# Patient Record
Sex: FEMALE | Race: BLACK OR AFRICAN AMERICAN | Employment: OTHER | URBAN - METROPOLITAN AREA
[De-identification: names, ages, dates, MRNs, and addresses within clinical notes are randomized per-mention and may not be internally consistent; named-entity substitution may affect disease eponyms.]

---

## 2020-02-06 ENCOUNTER — HOSPITAL ENCOUNTER (OUTPATIENT)
Dept: PREADMISSION TESTING | Age: 70
Discharge: HOME | End: 2020-02-10
Payer: MEDICARE

## 2020-02-06 ENCOUNTER — HOSPITAL ENCOUNTER (OUTPATIENT)
Dept: GENERAL RADIOLOGY | Age: 70
Discharge: HOME | End: 2020-02-08
Payer: MEDICARE

## 2020-02-06 VITALS
SYSTOLIC BLOOD PRESSURE: 126 MMHG | DIASTOLIC BLOOD PRESSURE: 70 MMHG | HEART RATE: 87 BPM | OXYGEN SATURATION: 97 % | WEIGHT: 229 LBS | RESPIRATION RATE: 16 BRPM | BODY MASS INDEX: 38.15 KG/M2 | HEIGHT: 65 IN

## 2020-02-06 LAB
-: ABNORMAL
ABO/RH: NORMAL
ABSOLUTE EOS #: 0.13 K/UL (ref 0–0.44)
ABSOLUTE IMMATURE GRANULOCYTE: 0.01 K/UL (ref 0–0.3)
ABSOLUTE LYMPH #: 2.13 K/UL (ref 1.1–3.7)
ABSOLUTE MONO #: 0.67 K/UL (ref 0.1–1.2)
ALBUMIN SERPL-MCNC: 3.8 G/DL (ref 3.5–5.2)
ALBUMIN/GLOBULIN RATIO: ABNORMAL (ref 1–2.5)
ALP BLD-CCNC: 70 U/L (ref 35–104)
ALT SERPL-CCNC: 10 U/L (ref 5–33)
AMORPHOUS: ABNORMAL
ANION GAP SERPL CALCULATED.3IONS-SCNC: 13 MMOL/L (ref 9–17)
ANTIBODY SCREEN: NEGATIVE
ARM BAND NUMBER: NORMAL
AST SERPL-CCNC: 15 U/L
BACTERIA: ABNORMAL
BASOPHILS # BLD: 0 % (ref 0–2)
BASOPHILS ABSOLUTE: 0.03 K/UL (ref 0–0.2)
BILIRUB SERPL-MCNC: 0.15 MG/DL (ref 0.3–1.2)
BILIRUBIN URINE: NEGATIVE
BUN BLDV-MCNC: 24 MG/DL (ref 8–23)
BUN/CREAT BLD: 25 (ref 9–20)
CALCIUM SERPL-MCNC: 9.4 MG/DL (ref 8.6–10.4)
CASTS UA: ABNORMAL /LPF
CHLORIDE BLD-SCNC: 101 MMOL/L (ref 98–107)
CO2: 21 MMOL/L (ref 20–31)
COLOR: YELLOW
COMMENT UA: ABNORMAL
CREAT SERPL-MCNC: 0.96 MG/DL (ref 0.5–0.9)
CRYSTALS, UA: ABNORMAL /HPF
DIFFERENTIAL TYPE: ABNORMAL
EOSINOPHILS RELATIVE PERCENT: 2 % (ref 1–4)
EPITHELIAL CELLS UA: ABNORMAL /HPF (ref 0–5)
ESTIMATED AVERAGE GLUCOSE: 140 MG/DL
EXPIRATION DATE: NORMAL
GFR AFRICAN AMERICAN: >60 ML/MIN
GFR NON-AFRICAN AMERICAN: 58 ML/MIN
GFR SERPL CREATININE-BSD FRML MDRD: ABNORMAL ML/MIN/{1.73_M2}
GFR SERPL CREATININE-BSD FRML MDRD: ABNORMAL ML/MIN/{1.73_M2}
GLUCOSE BLD-MCNC: 123 MG/DL (ref 70–99)
GLUCOSE URINE: NEGATIVE
HBA1C MFR BLD: 6.5 % (ref 4–6)
HCT VFR BLD CALC: 47.7 % (ref 36.3–47.1)
HEMOGLOBIN: 14.6 G/DL (ref 11.9–15.1)
IMMATURE GRANULOCYTES: 0 %
KETONES, URINE: NEGATIVE
LEUKOCYTE ESTERASE, URINE: ABNORMAL
LYMPHOCYTES # BLD: 30 % (ref 24–43)
MCH RBC QN AUTO: 26.7 PG (ref 25.2–33.5)
MCHC RBC AUTO-ENTMCNC: 30.6 G/DL (ref 28.4–34.8)
MCV RBC AUTO: 87.2 FL (ref 82.6–102.9)
MONOCYTES # BLD: 9 % (ref 3–12)
MRSA, DNA, NASAL: NORMAL
MUCUS: ABNORMAL
NITRITE, URINE: POSITIVE
NRBC AUTOMATED: ABNORMAL PER 100 WBC
OTHER OBSERVATIONS UA: ABNORMAL
PDW BLD-RTO: 14.3 % (ref 11.8–14.4)
PH UA: 5.5 (ref 5–8)
PLATELET # BLD: 256 K/UL (ref 138–453)
PLATELET ESTIMATE: ABNORMAL
PMV BLD AUTO: 10.6 FL (ref 8.1–13.5)
POTASSIUM SERPL-SCNC: 5 MMOL/L (ref 3.7–5.3)
PROTEIN UA: NEGATIVE
RBC # BLD: 5.47 M/UL (ref 3.95–5.11)
RBC # BLD: ABNORMAL 10*6/UL
RBC UA: ABNORMAL /HPF (ref 0–2)
RENAL EPITHELIAL, UA: ABNORMAL /HPF
SEDIMENTATION RATE, ERYTHROCYTE: 8 MM (ref 0–20)
SEG NEUTROPHILS: 59 % (ref 36–65)
SEGMENTED NEUTROPHILS ABSOLUTE COUNT: 4.21 K/UL (ref 1.5–8.1)
SODIUM BLD-SCNC: 135 MMOL/L (ref 135–144)
SPECIFIC GRAVITY UA: 1.02 (ref 1–1.03)
SPECIMEN DESCRIPTION: NORMAL
TOTAL PROTEIN: 7.5 G/DL (ref 6.4–8.3)
TRICHOMONAS: ABNORMAL
TURBIDITY: ABNORMAL
URINE HGB: NEGATIVE
UROBILINOGEN, URINE: NORMAL
WBC # BLD: 7.2 K/UL (ref 3.5–11.3)
WBC # BLD: ABNORMAL 10*3/UL
WBC UA: ABNORMAL /HPF (ref 0–5)
YEAST: ABNORMAL

## 2020-02-06 PROCEDURE — 83036 HEMOGLOBIN GLYCOSYLATED A1C: CPT

## 2020-02-06 PROCEDURE — 86850 RBC ANTIBODY SCREEN: CPT

## 2020-02-06 PROCEDURE — 85651 RBC SED RATE NONAUTOMATED: CPT

## 2020-02-06 PROCEDURE — 86901 BLOOD TYPING SEROLOGIC RH(D): CPT

## 2020-02-06 PROCEDURE — 80053 COMPREHEN METABOLIC PANEL: CPT

## 2020-02-06 PROCEDURE — 81001 URINALYSIS AUTO W/SCOPE: CPT

## 2020-02-06 PROCEDURE — 87641 MR-STAPH DNA AMP PROBE: CPT

## 2020-02-06 PROCEDURE — 87077 CULTURE AEROBIC IDENTIFY: CPT

## 2020-02-06 PROCEDURE — 86900 BLOOD TYPING SEROLOGIC ABO: CPT

## 2020-02-06 PROCEDURE — 87186 SC STD MICRODIL/AGAR DIL: CPT

## 2020-02-06 PROCEDURE — 85025 COMPLETE CBC W/AUTO DIFF WBC: CPT

## 2020-02-06 PROCEDURE — 36415 COLL VENOUS BLD VENIPUNCTURE: CPT

## 2020-02-06 PROCEDURE — 87086 URINE CULTURE/COLONY COUNT: CPT

## 2020-02-06 PROCEDURE — 71046 X-RAY EXAM CHEST 2 VIEWS: CPT

## 2020-02-06 RX ORDER — POTASSIUM CHLORIDE 750 MG/1
10 CAPSULE, EXTENDED RELEASE ORAL DAILY
COMMUNITY

## 2020-02-06 RX ORDER — MONTELUKAST SODIUM 10 MG/1
10 TABLET ORAL DAILY
COMMUNITY

## 2020-02-06 RX ORDER — SERTRALINE HYDROCHLORIDE 25 MG/1
12.5 TABLET, FILM COATED ORAL DAILY
COMMUNITY

## 2020-02-06 RX ORDER — ENALAPRIL MALEATE 20 MG/1
20 TABLET ORAL DAILY
COMMUNITY

## 2020-02-06 RX ORDER — AMLODIPINE BESYLATE 10 MG/1
10 TABLET ORAL DAILY
COMMUNITY

## 2020-02-06 RX ORDER — FUROSEMIDE 20 MG/1
20 TABLET ORAL DAILY
COMMUNITY

## 2020-02-06 RX ORDER — METFORMIN HYDROCHLORIDE 500 MG/1
1000 TABLET, EXTENDED RELEASE ORAL
COMMUNITY

## 2020-02-06 RX ORDER — CLONIDINE HYDROCHLORIDE 0.2 MG/1
0.2 TABLET ORAL 2 TIMES DAILY
COMMUNITY

## 2020-02-06 RX ORDER — ASPIRIN 325 MG
325 TABLET ORAL DAILY
Status: ON HOLD | COMMUNITY
End: 2020-02-26 | Stop reason: HOSPADM

## 2020-02-06 RX ORDER — MULTIVIT WITH MINERALS/LUTEIN
2000 TABLET ORAL DAILY
COMMUNITY

## 2020-02-06 SDOH — HEALTH STABILITY: MENTAL HEALTH: HOW OFTEN DO YOU HAVE A DRINK CONTAINING ALCOHOL?: NEVER

## 2020-02-06 ASSESSMENT — PROMIS GLOBAL HEALTH SCALE
IN GENERAL, PLEASE RATE HOW WELL YOU CARRY OUT YOUR USUAL SOCIAL ACTIVITIES (INCLUDES ACTIVITIES AT HOME, AT WORK, AND IN YOUR COMMUNITY, AND RESPONSIBILITIES AS A PARENT, CHILD, SPOUSE, EMPLOYEE, FRIEND, ETC) [ON A SCALE OF 1 (POOR) TO 5 (EXCELLENT)]?: 3
IN GENERAL, HOW WOULD YOU RATE YOUR MENTAL HEALTH, INCLUDING YOUR MOOD AND YOUR ABILITY TO THINK [ON A SCALE OF 1 (POOR) TO 5 (EXCELLENT)]?: 4
HOW IS THE PROMIS V1.1 BEING ADMINISTERED?: 0
SUM OF RESPONSES TO QUESTIONS 2, 4, 5, & 10: 14
IN THE PAST 7 DAYS, HOW OFTEN HAVE YOU BEEN BOTHERED BY EMOTIONAL PROBLEMS, SUCH AS FEELING ANXIOUS, DEPRESSED, OR IRRITABLE [ON A SCALE FROM 1 (NEVER) TO 5 (ALWAYS)]?: 3
IN GENERAL, HOW WOULD YOU RATE YOUR SATISFACTION WITH YOUR SOCIAL ACTIVITIES AND RELATIONSHIPS [ON A SCALE OF 1 (POOR) TO 5 (EXCELLENT)]?: 3
SUM OF RESPONSES TO QUESTIONS 3, 6, 7, & 8: 18
IN GENERAL, HOW WOULD YOU RATE YOUR PHYSICAL HEALTH [ON A SCALE OF 1 (POOR) TO 5 (EXCELLENT)]?: 3
IN GENERAL, WOULD YOU SAY YOUR HEALTH IS...[ON A SCALE OF 1 (POOR) TO 5 (EXCELLENT)]: 3
TO WHAT EXTENT ARE YOU ABLE TO CARRY OUT YOUR EVERYDAY PHYSICAL ACTIVITIES SUCH AS WALKING, CLIMBING STAIRS, CARRYING GROCERIES, OR MOVING A CHAIR [ON A SCALE OF 1 (NOT AT ALL) TO 5 (COMPLETELY)]?: 3
WHO IS THE PERSON COMPLETING THE PROMIS V1.1 SURVEY?: 0
IN THE PAST 7 DAYS, HOW WOULD YOU RATE YOUR FATIGUE ON AVERAGE [ON A SCALE FROM 1 (NONE) TO 5 (VERY SEVERE)]?: 3
IN THE PAST 7 DAYS, HOW WOULD YOU RATE YOUR PAIN ON AVERAGE [ON A SCALE FROM 0 (NO PAIN) TO 10 (WORST IMAGINABLE PAIN)]?: 9
IN GENERAL, WOULD YOU SAY YOUR QUALITY OF LIFE IS...[ON A SCALE OF 1 (POOR) TO 5 (EXCELLENT)]: 4

## 2020-02-06 ASSESSMENT — PAIN DESCRIPTION - LOCATION: LOCATION: KNEE

## 2020-02-06 ASSESSMENT — KOOS JR
HOW SEVERE IS YOUR KNEE STIFFNESS AFTER FIRST WAKING IN MORNING: 3
TWISING OR PIVOTING ON KNEE: 3
GOING UP OR DOWN STAIRS: 4
RISING FROM SITTING: 2
STRAIGHTENING KNEE FULLY: 3
STANDING UPRIGHT: 2
BENDING TO THE FLOOR TO PICK UP OBJECT: 2

## 2020-02-06 ASSESSMENT — PAIN SCALES - GENERAL: PAINLEVEL_OUTOF10: 9

## 2020-02-06 ASSESSMENT — PAIN DESCRIPTION - PROGRESSION: CLINICAL_PROGRESSION: NOT CHANGED

## 2020-02-06 ASSESSMENT — PAIN - FUNCTIONAL ASSESSMENT: PAIN_FUNCTIONAL_ASSESSMENT: PREVENTS OR INTERFERES WITH ALL ACTIVE AND SOME PASSIVE ACTIVITIES

## 2020-02-06 ASSESSMENT — PAIN DESCRIPTION - ORIENTATION: ORIENTATION: LEFT;RIGHT

## 2020-02-06 ASSESSMENT — PAIN DESCRIPTION - FREQUENCY: FREQUENCY: CONTINUOUS

## 2020-02-06 ASSESSMENT — PAIN DESCRIPTION - PAIN TYPE: TYPE: CHRONIC PAIN

## 2020-02-06 NOTE — PROGRESS NOTES
[address]  Joint Replacement Pre-surgical Assessment    Scheduled Surgery Date: 2020  Surgery Time: 730    Surgeon: [place] Cheeks  Procedure: right Total Knee    Primary Insurance Coverage MEDICARE PART A/[address] [place] [address] FEDERAL  Pre-op class attended YES 2020    PCP: Fer Marie MD  Clearance received by PCP: Yes    Anticipated Discharge Plan: home  Agency (if applicable): UNSURE    Significant PMH:   CARDIAC CATHETERIZATION   x 1. 5-6 years ago (Written 2020).  No stents placed per pt. Provider   CATARACT REMOVAL WITH IMPLANT Bilateral   Provider    SECTION   x2 Provider   COLONOSCOPY    Provider   FRACTURE SURGERY Right  ankle Provider   GASTRIC BYPASS SURGERY    Provider   HERNIA REPAIR   abdominal x2 Provider   HYSTERECTOMY    Provider   OTHER SURGICAL HISTORY   benign skin lesions removed from face Provider   ROTATOR CUFF REPAIR Left   Provider   SMALL INTESTINE SURGERY   gangrenous bowel Provider   TONSILLECTOMY AND ADENOIDECTOMY    Provider   ED Notes     ED Notes    Medical History     Diagnosis Date Comment Source   Anxiety   Patient   Arthritis   Provider   Asthma   Provider   CHF (congestive heart failure) ([facility] [facility]  x2 episodes, last episode  Provider   CKD (chronic kidney disease)  Per medical record Provider   Cold intolerance  Pt states she develops edema in her face, ears, hands, and feet with exposure to cold temperatures. Patient   COPD (chronic obstructive pulmonary disease) ([facility] [address]   Provider   Diabetes mellitus ([facility] [address]   Provider   Heat intolerance  Pt states she develops edema in her face, ears, hands, and feet with exposure to heat.  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

## 2020-02-06 NOTE — PRE-PROCEDURE INSTRUCTIONS
ARRIVE AT [facility] [address] ON Tuesday, 2/25/2020 at 0530 AM    Once you enter the hospital lobby, take the elevators to the second floor. Check-In is at the surgery registration desk. Continue to take your home medications as you normally do up to and including the night before surgery with the exception of any blood thinning medications. Please stop any blood thinning medications as directed by your surgeon or prescribing physician. Failure to stop certain medications may interfere with your scheduled surgery. These may include:  Aspirin, Warfarin (Coumadin), Clopidogrel (Plavix), Ibuprofen (Motrin, Advil), Naproxen (Aleve), Meloxicam (Mobic), Celecoxib (Celebrex), Eliquis, Pradaxa, Xarelto, Effient, Fish Oil, Herbal supplements. Stop aspirin 10 days before surgery      If you are diabetic, do not take any of your diabetic medications by mouth the morning of surgery. If you are taking insulin contact the doctor that manages your diabetes for instructions about any changes to your insulin dosages the day before surgery. Do not inject insulin or other injectable diabetic medications the morning of surgery unless otherwise instructed by the doctor who manages your diabetes. Please take the following medication(s) the day of surgery with a small sip of water:  Clonidine,amlodipine,singular    Please use your inhaler(s) if needed and bring your inhaler(s) from home the day of surgery. PREPARING FOR YOUR SURGERY:     Before surgery, you can play an important role in your own health. Because skin is not sterile, we need to be sure that your skin is as free of germs as possible before surgery by carefully washing before surgery. Preparing or prepping skin before surgery can reduce the risk of a surgical site infection.   Do not shave the area of your body where your surgery will be performed unless you received specific permission from your physician.     You will need to shower at home the have someone to stay with you, your procedure may be cancelled.       If you have any other questions regarding your procedure or the day of surgery, please call [phone]      _________________________  ____________________________  Signature (Patient)              Signature (Provider) & date

## 2020-02-06 NOTE — H&P
History and Physical Service   [facility]    HISTORY AND PHYSICAL EXAMINATION            Date of Evaluation: 2/6/2020  Patient name:  Kim Ambriz  MRN:   [id number]  YOB: 1950  PCP:    Ashlie Jorgensen MD    History Obtained From:     Patient, Medical record    History of Present Illness: This is Kim Ambriz a 71 y.o. female who presents for a pre-admission testing appointment for an upcoming right total knee arthroplasty by Dr. Ira Hardin scheduled on 02/25/2020 at 0730 due to right knee osteoarthritis and osteoporosis. The patient's chief complaint is constant, 6-10/10 right knee pain that has progressively worsened over the past 5 years. Right knee pain is aggravated by walking; and is minimally relieved with rest. The right knee gives out, swells, and has decreased range of motion. Prior treatment includes right knee injections. Denies recent falls and injuries. Sleep apnea questionnaire  1) Do you snore loudly? No  2) Do you often feel tired, fatigued, or sleepy? Yes  3) Has anyone observed you stop breathing or choking/gasping during your sleep? No  4) Do you have hypertension? Yes  5) BMI >35 kg/m2? Yes  6) Age > 48? Yes  7) Pt is a male? No    History of asthma, COPD, CHF, hypertension, diabetes, CKD, and sickle cell anemia. S/p cardiac catheterization without stent placement 5-6 years ago per pt. Alert and oriented without apparent distress. Denies chest pain, dyspnea, dizziness, and palpitations. Pt follows-up with Dr. Rema Robertson from cardiology. Pt no longer follows-up with a hematologist.      Pt states her heart stopped for 1 minute while she was hospitalized 5-6 years ago. Denies problems with her heart since that time. Denies history of cardiac arrest, syncopal episodes, palpitations, and chest pain. Stress test completed on 01/10/2020 (See paper chart). ECHO completed 01/10/2020 EF 55-60% (See paper chart).      History of an allergic

## 2020-02-07 LAB
CULTURE: ABNORMAL
Lab: ABNORMAL
SPECIMEN DESCRIPTION: ABNORMAL

## 2020-02-10 RX ORDER — CLINDAMYCIN PHOSPHATE 900 MG/50ML
900 INJECTION INTRAVENOUS ONCE
Status: CANCELLED | OUTPATIENT
Start: 2020-02-25

## 2020-02-25 ENCOUNTER — ANESTHESIA (OUTPATIENT)
Dept: OPERATING ROOM | Age: 70
End: 2020-02-25
Payer: MEDICARE

## 2020-02-25 ENCOUNTER — HOSPITAL ENCOUNTER (OUTPATIENT)
Age: 70
Discharge: HOME | End: 2020-02-26
Attending: ORTHOPAEDIC SURGERY | Admitting: ORTHOPAEDIC SURGERY
Payer: MEDICARE

## 2020-02-25 ENCOUNTER — APPOINTMENT (OUTPATIENT)
Dept: GENERAL RADIOLOGY | Age: 70
End: 2020-02-25
Attending: ORTHOPAEDIC SURGERY
Payer: MEDICARE

## 2020-02-25 ENCOUNTER — ANESTHESIA EVENT (OUTPATIENT)
Dept: OPERATING ROOM | Age: 70
End: 2020-02-25
Payer: MEDICARE

## 2020-02-25 VITALS — DIASTOLIC BLOOD PRESSURE: 55 MMHG | TEMPERATURE: 96.4 F | OXYGEN SATURATION: 89 % | SYSTOLIC BLOOD PRESSURE: 93 MMHG

## 2020-02-25 PROBLEM — E11.9 TYPE 2 DIABETES MELLITUS WITHOUT COMPLICATION, WITHOUT LONG-TERM CURRENT USE OF INSULIN (HCC): Status: ACTIVE | Noted: 2020-02-25

## 2020-02-25 PROBLEM — I10 ESSENTIAL HYPERTENSION: Status: ACTIVE | Noted: 2020-02-25

## 2020-02-25 PROBLEM — M17.11 PRIMARY OSTEOARTHRITIS OF RIGHT KNEE: Chronic | Status: ACTIVE | Noted: 2020-02-25

## 2020-02-25 LAB
GLUCOSE BLD-MCNC: 134 MG/DL (ref 65–105)
GLUCOSE BLD-MCNC: 165 MG/DL (ref 65–105)
GLUCOSE BLD-MCNC: 190 MG/DL (ref 65–105)

## 2020-02-25 PROCEDURE — 2709999900 HC NON-CHARGEABLE SUPPLY: Performed by: ORTHOPAEDIC SURGERY

## 2020-02-25 PROCEDURE — 6360000002 HC RX W HCPCS: Performed by: NURSE ANESTHETIST, CERTIFIED REGISTERED

## 2020-02-25 PROCEDURE — 2580000003 HC RX 258: Performed by: ORTHOPAEDIC SURGERY

## 2020-02-25 PROCEDURE — 7100000000 HC PACU RECOVERY - FIRST 15 MIN: Performed by: ORTHOPAEDIC SURGERY

## 2020-02-25 PROCEDURE — 2500000003 HC RX 250 WO HCPCS: Performed by: ANESTHESIOLOGY

## 2020-02-25 PROCEDURE — 2580000003 HC RX 258: Performed by: NURSE ANESTHETIST, CERTIFIED REGISTERED

## 2020-02-25 PROCEDURE — 6360000002 HC RX W HCPCS: Performed by: ANESTHESIOLOGY

## 2020-02-25 PROCEDURE — 97535 SELF CARE MNGMENT TRAINING: CPT

## 2020-02-25 PROCEDURE — 6370000000 HC RX 637 (ALT 250 FOR IP): Performed by: NURSE PRACTITIONER

## 2020-02-25 PROCEDURE — 3700000001 HC ADD 15 MINUTES (ANESTHESIA): Performed by: ORTHOPAEDIC SURGERY

## 2020-02-25 PROCEDURE — 6370000000 HC RX 637 (ALT 250 FOR IP): Performed by: ORTHOPAEDIC SURGERY

## 2020-02-25 PROCEDURE — 7100000001 HC PACU RECOVERY - ADDTL 15 MIN: Performed by: ORTHOPAEDIC SURGERY

## 2020-02-25 PROCEDURE — 97161 PT EVAL LOW COMPLEX 20 MIN: CPT

## 2020-02-25 PROCEDURE — 97110 THERAPEUTIC EXERCISES: CPT

## 2020-02-25 PROCEDURE — 3700000000 HC ANESTHESIA ATTENDED CARE: Performed by: ORTHOPAEDIC SURGERY

## 2020-02-25 PROCEDURE — 6370000000 HC RX 637 (ALT 250 FOR IP): Performed by: ANESTHESIOLOGY

## 2020-02-25 PROCEDURE — 99213 OFFICE O/P EST LOW 20 MIN: CPT | Performed by: INTERNAL MEDICINE

## 2020-02-25 PROCEDURE — C1776 JOINT DEVICE (IMPLANTABLE): HCPCS | Performed by: ORTHOPAEDIC SURGERY

## 2020-02-25 PROCEDURE — 97530 THERAPEUTIC ACTIVITIES: CPT

## 2020-02-25 PROCEDURE — 3600000005 HC SURGERY LEVEL 5 BASE: Performed by: ORTHOPAEDIC SURGERY

## 2020-02-25 PROCEDURE — 2580000003 HC RX 258: Performed by: ANESTHESIOLOGY

## 2020-02-25 PROCEDURE — 73560 X-RAY EXAM OF KNEE 1 OR 2: CPT

## 2020-02-25 PROCEDURE — 3600000015 HC SURGERY LEVEL 5 ADDTL 15MIN: Performed by: ORTHOPAEDIC SURGERY

## 2020-02-25 PROCEDURE — 97116 GAIT TRAINING THERAPY: CPT

## 2020-02-25 PROCEDURE — 2500000003 HC RX 250 WO HCPCS: Performed by: ORTHOPAEDIC SURGERY

## 2020-02-25 PROCEDURE — 64447 NJX AA&/STRD FEMORAL NRV IMG: CPT | Performed by: ANESTHESIOLOGY

## 2020-02-25 PROCEDURE — 82947 ASSAY GLUCOSE BLOOD QUANT: CPT

## 2020-02-25 PROCEDURE — 2500000003 HC RX 250 WO HCPCS: Performed by: NURSE ANESTHETIST, CERTIFIED REGISTERED

## 2020-02-25 PROCEDURE — 97166 OT EVAL MOD COMPLEX 45 MIN: CPT

## 2020-02-25 PROCEDURE — C1713 ANCHOR/SCREW BN/BN,TIS/BN: HCPCS | Performed by: ORTHOPAEDIC SURGERY

## 2020-02-25 DEVICE — COMPONENT PAT DIA32MM KNEE POLY CEM MEDIALIZED ANAT ATTUNE: Type: IMPLANTABLE DEVICE | Site: KNEE | Status: FUNCTIONAL

## 2020-02-25 DEVICE — STEM FEM L50MM DIA14MM KNEE CEM REV ATTUNE: Type: IMPLANTABLE DEVICE | Site: KNEE | Status: FUNCTIONAL

## 2020-02-25 DEVICE — BASEPLATE TIB SZ 5 FIX BEAR CO CHROM MOLYBDENUM TI ALLY END: Type: IMPLANTABLE DEVICE | Site: KNEE | Status: FUNCTIONAL

## 2020-02-25 DEVICE — IMPLANTABLE DEVICE: Type: IMPLANTABLE DEVICE | Site: KNEE | Status: FUNCTIONAL

## 2020-02-25 DEVICE — CEMENT BNE 40GM FULL DOSE PMMA W/O ANTIBIO HI VISC N RADPQ: Type: IMPLANTABLE DEVICE | Site: KNEE | Status: FUNCTIONAL

## 2020-02-25 RX ORDER — TRANEXAMIC ACID 100 MG/ML
INJECTION, SOLUTION INTRAVENOUS PRN
Status: DISCONTINUED | OUTPATIENT
Start: 2020-02-25 | End: 2020-02-25 | Stop reason: SDUPTHER

## 2020-02-25 RX ORDER — PHENYLEPHRINE HCL IN 0.9% NACL 1 MG/10 ML
SYRINGE (ML) INTRAVENOUS PRN
Status: DISCONTINUED | OUTPATIENT
Start: 2020-02-25 | End: 2020-02-25 | Stop reason: SDUPTHER

## 2020-02-25 RX ORDER — SODIUM CHLORIDE, SODIUM LACTATE, POTASSIUM CHLORIDE, CALCIUM CHLORIDE 600; 310; 30; 20 MG/100ML; MG/100ML; MG/100ML; MG/100ML
INJECTION, SOLUTION INTRAVENOUS CONTINUOUS
Status: DISCONTINUED | OUTPATIENT
Start: 2020-02-25 | End: 2020-02-26 | Stop reason: HOSPADM

## 2020-02-25 RX ORDER — CLINDAMYCIN PHOSPHATE 900 MG/50ML
900 INJECTION INTRAVENOUS ONCE
Status: COMPLETED | OUTPATIENT
Start: 2020-02-25 | End: 2020-02-25

## 2020-02-25 RX ORDER — SENNA AND DOCUSATE SODIUM 50; 8.6 MG/1; MG/1
1 TABLET, FILM COATED ORAL 2 TIMES DAILY
Status: DISCONTINUED | OUTPATIENT
Start: 2020-02-25 | End: 2020-02-25

## 2020-02-25 RX ORDER — ENALAPRIL MALEATE 10 MG/1
20 TABLET ORAL DAILY
Status: DISCONTINUED | OUTPATIENT
Start: 2020-02-26 | End: 2020-02-25

## 2020-02-25 RX ORDER — GABAPENTIN 300 MG/1
300 CAPSULE ORAL ONCE
Status: COMPLETED | OUTPATIENT
Start: 2020-02-25 | End: 2020-02-25

## 2020-02-25 RX ORDER — ASCORBIC ACID 500 MG
2000 TABLET ORAL DAILY
Status: DISCONTINUED | OUTPATIENT
Start: 2020-02-25 | End: 2020-02-25

## 2020-02-25 RX ORDER — PROMETHAZINE HYDROCHLORIDE 25 MG/1
12.5 TABLET ORAL EVERY 6 HOURS PRN
Status: DISCONTINUED | OUTPATIENT
Start: 2020-02-25 | End: 2020-02-26 | Stop reason: HOSPADM

## 2020-02-25 RX ORDER — OXYCODONE HYDROCHLORIDE 5 MG/1
5 TABLET ORAL EVERY 4 HOURS PRN
Status: DISCONTINUED | OUTPATIENT
Start: 2020-02-25 | End: 2020-02-25

## 2020-02-25 RX ORDER — ROPIVACAINE HYDROCHLORIDE 5 MG/ML
INJECTION, SOLUTION EPIDURAL; INFILTRATION; PERINEURAL
Status: COMPLETED | OUTPATIENT
Start: 2020-02-25 | End: 2020-02-25

## 2020-02-25 RX ORDER — ONDANSETRON 2 MG/ML
4 INJECTION INTRAMUSCULAR; INTRAVENOUS
Status: DISCONTINUED | OUTPATIENT
Start: 2020-02-25 | End: 2020-02-25 | Stop reason: HOSPADM

## 2020-02-25 RX ORDER — ENALAPRIL MALEATE 20 MG/1
20 TABLET ORAL DAILY
Status: DISCONTINUED | OUTPATIENT
Start: 2020-02-26 | End: 2020-02-26 | Stop reason: HOSPADM

## 2020-02-25 RX ORDER — OXYCODONE HYDROCHLORIDE 5 MG/1
10 TABLET ORAL EVERY 4 HOURS PRN
Status: DISCONTINUED | OUTPATIENT
Start: 2020-02-25 | End: 2020-02-25

## 2020-02-25 RX ORDER — ACETAMINOPHEN 500 MG
1000 TABLET ORAL ONCE
Status: COMPLETED | OUTPATIENT
Start: 2020-02-25 | End: 2020-02-25

## 2020-02-25 RX ORDER — FAMOTIDINE 20 MG/1
20 TABLET, FILM COATED ORAL 2 TIMES DAILY PRN
Status: DISCONTINUED | OUTPATIENT
Start: 2020-02-25 | End: 2020-02-26 | Stop reason: HOSPADM

## 2020-02-25 RX ORDER — MONTELUKAST SODIUM 10 MG/1
10 TABLET ORAL DAILY
Status: DISCONTINUED | OUTPATIENT
Start: 2020-02-26 | End: 2020-02-26 | Stop reason: HOSPADM

## 2020-02-25 RX ORDER — SODIUM CHLORIDE 0.9 % (FLUSH) 0.9 %
10 SYRINGE (ML) INJECTION EVERY 12 HOURS SCHEDULED
Status: DISCONTINUED | OUTPATIENT
Start: 2020-02-25 | End: 2020-02-26 | Stop reason: HOSPADM

## 2020-02-25 RX ORDER — GLYCOPYRROLATE 1 MG/5 ML
SYRINGE (ML) INTRAVENOUS PRN
Status: DISCONTINUED | OUTPATIENT
Start: 2020-02-25 | End: 2020-02-25 | Stop reason: SDUPTHER

## 2020-02-25 RX ORDER — CLINDAMYCIN PHOSPHATE 900 MG/50ML
900 INJECTION INTRAVENOUS EVERY 8 HOURS
Status: COMPLETED | OUTPATIENT
Start: 2020-02-25 | End: 2020-02-25

## 2020-02-25 RX ORDER — CLONIDINE HYDROCHLORIDE 0.2 MG/1
0.2 TABLET ORAL 2 TIMES DAILY
Status: DISCONTINUED | OUTPATIENT
Start: 2020-02-25 | End: 2020-02-26 | Stop reason: HOSPADM

## 2020-02-25 RX ORDER — SODIUM CHLORIDE, SODIUM LACTATE, POTASSIUM CHLORIDE, CALCIUM CHLORIDE 600; 310; 30; 20 MG/100ML; MG/100ML; MG/100ML; MG/100ML
INJECTION, SOLUTION INTRAVENOUS CONTINUOUS PRN
Status: DISCONTINUED | OUTPATIENT
Start: 2020-02-25 | End: 2020-02-25 | Stop reason: SDUPTHER

## 2020-02-25 RX ORDER — POLYETHYLENE GLYCOL 3350 17 G/17G
17 POWDER, FOR SOLUTION ORAL DAILY
Status: DISCONTINUED | OUTPATIENT
Start: 2020-02-25 | End: 2020-02-26 | Stop reason: HOSPADM

## 2020-02-25 RX ORDER — PROPOFOL 10 MG/ML
INJECTION, EMULSION INTRAVENOUS CONTINUOUS PRN
Status: DISCONTINUED | OUTPATIENT
Start: 2020-02-25 | End: 2020-02-25 | Stop reason: SDUPTHER

## 2020-02-25 RX ORDER — METFORMIN HYDROCHLORIDE 500 MG/1
500 TABLET, EXTENDED RELEASE ORAL
Status: DISCONTINUED | OUTPATIENT
Start: 2020-02-26 | End: 2020-02-26 | Stop reason: HOSPADM

## 2020-02-25 RX ORDER — DEXTROSE MONOHYDRATE 25 G/50ML
12.5 INJECTION, SOLUTION INTRAVENOUS PRN
Status: DISCONTINUED | OUTPATIENT
Start: 2020-02-25 | End: 2020-02-26 | Stop reason: HOSPADM

## 2020-02-25 RX ORDER — ONDANSETRON 4 MG/1
4 TABLET, ORALLY DISINTEGRATING ORAL EVERY 6 HOURS PRN
Status: DISCONTINUED | OUTPATIENT
Start: 2020-02-25 | End: 2020-02-26 | Stop reason: HOSPADM

## 2020-02-25 RX ORDER — HYDROMORPHONE HCL 110MG/55ML
0.25 PATIENT CONTROLLED ANALGESIA SYRINGE INTRAVENOUS EVERY 5 MIN PRN
Status: DISCONTINUED | OUTPATIENT
Start: 2020-02-25 | End: 2020-02-25 | Stop reason: HOSPADM

## 2020-02-25 RX ORDER — DEXTROSE MONOHYDRATE 50 MG/ML
100 INJECTION, SOLUTION INTRAVENOUS PRN
Status: DISCONTINUED | OUTPATIENT
Start: 2020-02-25 | End: 2020-02-26 | Stop reason: HOSPADM

## 2020-02-25 RX ORDER — FENTANYL CITRATE 50 UG/ML
25 INJECTION, SOLUTION INTRAMUSCULAR; INTRAVENOUS EVERY 5 MIN PRN
Status: DISCONTINUED | OUTPATIENT
Start: 2020-02-25 | End: 2020-02-25 | Stop reason: HOSPADM

## 2020-02-25 RX ORDER — SODIUM CHLORIDE, SODIUM LACTATE, POTASSIUM CHLORIDE, CALCIUM CHLORIDE 600; 310; 30; 20 MG/100ML; MG/100ML; MG/100ML; MG/100ML
INJECTION, SOLUTION INTRAVENOUS CONTINUOUS
Status: DISCONTINUED | OUTPATIENT
Start: 2020-02-25 | End: 2020-02-25

## 2020-02-25 RX ORDER — BUPIVACAINE HYDROCHLORIDE 5 MG/ML
INJECTION, SOLUTION EPIDURAL; INTRACAUDAL
Status: COMPLETED | OUTPATIENT
Start: 2020-02-25 | End: 2020-02-25

## 2020-02-25 RX ORDER — SODIUM CHLORIDE 0.9 % (FLUSH) 0.9 %
10 SYRINGE (ML) INJECTION PRN
Status: DISCONTINUED | OUTPATIENT
Start: 2020-02-25 | End: 2020-02-26 | Stop reason: HOSPADM

## 2020-02-25 RX ORDER — ONDANSETRON 2 MG/ML
4 INJECTION INTRAMUSCULAR; INTRAVENOUS EVERY 6 HOURS PRN
Status: DISCONTINUED | OUTPATIENT
Start: 2020-02-25 | End: 2020-02-26 | Stop reason: HOSPADM

## 2020-02-25 RX ORDER — TRAMADOL HYDROCHLORIDE 50 MG/1
50 TABLET ORAL EVERY 4 HOURS PRN
Status: DISCONTINUED | OUTPATIENT
Start: 2020-02-25 | End: 2020-02-26 | Stop reason: HOSPADM

## 2020-02-25 RX ORDER — LIDOCAINE HYDROCHLORIDE 20 MG/ML
INJECTION, SOLUTION EPIDURAL; INFILTRATION; INTRACAUDAL; PERINEURAL PRN
Status: DISCONTINUED | OUTPATIENT
Start: 2020-02-25 | End: 2020-02-25 | Stop reason: SDUPTHER

## 2020-02-25 RX ORDER — ONDANSETRON 2 MG/ML
4 INJECTION INTRAMUSCULAR; INTRAVENOUS EVERY 6 HOURS PRN
Status: DISCONTINUED | OUTPATIENT
Start: 2020-02-25 | End: 2020-02-25 | Stop reason: SDUPTHER

## 2020-02-25 RX ORDER — FUROSEMIDE 20 MG/1
20 TABLET ORAL DAILY
Status: DISCONTINUED | OUTPATIENT
Start: 2020-02-26 | End: 2020-02-25

## 2020-02-25 RX ORDER — MIDAZOLAM HYDROCHLORIDE 1 MG/ML
2 INJECTION INTRAMUSCULAR; INTRAVENOUS ONCE
Status: COMPLETED | OUTPATIENT
Start: 2020-02-25 | End: 2020-02-25

## 2020-02-25 RX ORDER — ACETAMINOPHEN 325 MG/1
650 TABLET ORAL EVERY 6 HOURS SCHEDULED
Status: DISCONTINUED | OUTPATIENT
Start: 2020-02-25 | End: 2020-02-26 | Stop reason: HOSPADM

## 2020-02-25 RX ORDER — SERTRALINE HYDROCHLORIDE 25 MG/1
12.5 TABLET, FILM COATED ORAL DAILY
Status: DISCONTINUED | OUTPATIENT
Start: 2020-02-26 | End: 2020-02-26 | Stop reason: HOSPADM

## 2020-02-25 RX ORDER — LANOLIN ALCOHOL/MO/W.PET/CERES
3 CREAM (GRAM) TOPICAL NIGHTLY PRN
Status: DISCONTINUED | OUTPATIENT
Start: 2020-02-25 | End: 2020-02-26 | Stop reason: HOSPADM

## 2020-02-25 RX ORDER — AMLODIPINE BESYLATE 10 MG/1
10 TABLET ORAL DAILY
Status: DISCONTINUED | OUTPATIENT
Start: 2020-02-25 | End: 2020-02-26 | Stop reason: HOSPADM

## 2020-02-25 RX ORDER — POTASSIUM CHLORIDE 750 MG/1
10 TABLET, FILM COATED, EXTENDED RELEASE ORAL DAILY
Status: DISCONTINUED | OUTPATIENT
Start: 2020-02-26 | End: 2020-02-26 | Stop reason: HOSPADM

## 2020-02-25 RX ADMIN — SODIUM CHLORIDE, POTASSIUM CHLORIDE, SODIUM LACTATE AND CALCIUM CHLORIDE: 600; 310; 30; 20 INJECTION, SOLUTION INTRAVENOUS at 06:45

## 2020-02-25 RX ADMIN — Medication 50 MCG: at 09:22

## 2020-02-25 RX ADMIN — OXYCODONE HYDROCHLORIDE 10 MG: 5 TABLET ORAL at 17:10

## 2020-02-25 RX ADMIN — BUPIVACAINE HYDROCHLORIDE 10 ML: 5 INJECTION, SOLUTION EPIDURAL; INTRACAUDAL; PERINEURAL at 07:18

## 2020-02-25 RX ADMIN — CLINDAMYCIN PHOSPHATE 900 MG: 900 INJECTION, SOLUTION INTRAVENOUS at 07:54

## 2020-02-25 RX ADMIN — CLINDAMYCIN PHOSPHATE 900 MG: 900 INJECTION, SOLUTION INTRAVENOUS at 14:58

## 2020-02-25 RX ADMIN — MIDAZOLAM 2 MG: 1 INJECTION INTRAMUSCULAR; INTRAVENOUS at 07:02

## 2020-02-25 RX ADMIN — ROPIVACAINE HYDROCHLORIDE 30 ML: 5 INJECTION, SOLUTION EPIDURAL; INFILTRATION; PERINEURAL at 07:18

## 2020-02-25 RX ADMIN — ASPIRIN 325 MG: 325 TABLET, DELAYED RELEASE ORAL at 20:13

## 2020-02-25 RX ADMIN — PROPOFOL 50 MCG/KG/MIN: 10 INJECTION, EMULSION INTRAVENOUS at 07:57

## 2020-02-25 RX ADMIN — Medication 0.3 MG: at 08:28

## 2020-02-25 RX ADMIN — CLINDAMYCIN PHOSPHATE 900 MG: 900 INJECTION, SOLUTION INTRAVENOUS at 20:13

## 2020-02-25 RX ADMIN — GABAPENTIN 300 MG: 300 CAPSULE ORAL at 07:19

## 2020-02-25 RX ADMIN — Medication 50 MCG: at 08:33

## 2020-02-25 RX ADMIN — ACETAMINOPHEN 1000 MG: 500 TABLET ORAL at 07:19

## 2020-02-25 RX ADMIN — ACETAMINOPHEN 650 MG: 325 TABLET ORAL at 15:01

## 2020-02-25 RX ADMIN — TRAMADOL HYDROCHLORIDE 50 MG: 50 TABLET, FILM COATED ORAL at 20:13

## 2020-02-25 RX ADMIN — SODIUM CHLORIDE, POTASSIUM CHLORIDE, SODIUM LACTATE AND CALCIUM CHLORIDE: 600; 310; 30; 20 INJECTION, SOLUTION INTRAVENOUS at 18:10

## 2020-02-25 RX ADMIN — INSULIN LISPRO 1 UNITS: 100 INJECTION, SOLUTION INTRAVENOUS; SUBCUTANEOUS at 18:04

## 2020-02-25 RX ADMIN — ACETAMINOPHEN 650 MG: 325 TABLET ORAL at 20:13

## 2020-02-25 RX ADMIN — INSULIN LISPRO 1 UNITS: 100 INJECTION, SOLUTION INTRAVENOUS; SUBCUTANEOUS at 22:10

## 2020-02-25 RX ADMIN — LIDOCAINE HYDROCHLORIDE 60 MG: 20 INJECTION, SOLUTION EPIDURAL; INFILTRATION; INTRACAUDAL; PERINEURAL at 07:57

## 2020-02-25 RX ADMIN — Medication 25 MCG: at 07:55

## 2020-02-25 RX ADMIN — TRANEXAMIC ACID 1000 MG: 1 INJECTION, SOLUTION INTRAVENOUS at 08:11

## 2020-02-25 RX ADMIN — ASPIRIN 325 MG: 325 TABLET, DELAYED RELEASE ORAL at 15:02

## 2020-02-25 RX ADMIN — TRANEXAMIC ACID 1000 MG: 1 INJECTION, SOLUTION INTRAVENOUS at 09:10

## 2020-02-25 RX ADMIN — SODIUM CHLORIDE, POTASSIUM CHLORIDE, SODIUM LACTATE AND CALCIUM CHLORIDE: 600; 310; 30; 20 INJECTION, SOLUTION INTRAVENOUS at 07:39

## 2020-02-25 ASSESSMENT — PULMONARY FUNCTION TESTS
PIF_VALUE: 1
PIF_VALUE: 0
PIF_VALUE: 1
PIF_VALUE: 0
PIF_VALUE: 1
PIF_VALUE: 0
PIF_VALUE: 1
PIF_VALUE: 0
PIF_VALUE: 1
PIF_VALUE: 0
PIF_VALUE: 1
PIF_VALUE: 0
PIF_VALUE: 1
PIF_VALUE: 0
PIF_VALUE: 1

## 2020-02-25 ASSESSMENT — PAIN DESCRIPTION - DESCRIPTORS
DESCRIPTORS: ACHING;DULL
DESCRIPTORS: ACHING;CRAMPING

## 2020-02-25 ASSESSMENT — PAIN SCALES - GENERAL
PAINLEVEL_OUTOF10: 8
PAINLEVEL_OUTOF10: 0
PAINLEVEL_OUTOF10: 0
PAINLEVEL_OUTOF10: 5
PAINLEVEL_OUTOF10: 0
PAINLEVEL_OUTOF10: 4
PAINLEVEL_OUTOF10: 5
PAINLEVEL_OUTOF10: 0
PAINLEVEL_OUTOF10: 0

## 2020-02-25 ASSESSMENT — PAIN DESCRIPTION - DIRECTION: RADIATING_TOWARDS: RIGHT THIGH

## 2020-02-25 ASSESSMENT — PAIN DESCRIPTION - FREQUENCY: FREQUENCY: CONTINUOUS

## 2020-02-25 ASSESSMENT — PAIN - FUNCTIONAL ASSESSMENT
PAIN_FUNCTIONAL_ASSESSMENT: 0-10
PAIN_FUNCTIONAL_ASSESSMENT: PREVENTS OR INTERFERES SOME ACTIVE ACTIVITIES AND ADLS

## 2020-02-25 ASSESSMENT — PAIN DESCRIPTION - LOCATION: LOCATION: KNEE

## 2020-02-25 ASSESSMENT — PAIN DESCRIPTION - ONSET: ONSET: ON-GOING

## 2020-02-25 ASSESSMENT — PAIN DESCRIPTION - ORIENTATION: ORIENTATION: RIGHT

## 2020-02-25 ASSESSMENT — PAIN DESCRIPTION - PAIN TYPE: TYPE: SURGICAL PAIN

## 2020-02-25 ASSESSMENT — LIFESTYLE VARIABLES: SMOKING_STATUS: 0

## 2020-02-25 ASSESSMENT — PAIN DESCRIPTION - PROGRESSION: CLINICAL_PROGRESSION: NOT CHANGED

## 2020-02-25 NOTE — H&P
(See paper chart). History of an allergic reaction to extreme hot and cold temperatures. Pt states she develops edema in her face, ears, hands and feet with exposure to extreme hot or cold temperatures. Functional Capacity:              1) Pt is able to walk 2 city blocks on level ground without SOB. 2) Pt is able to climb 2 flights of stairs without SOB. Past Medical History:      Past Medical History        Past Medical History:   Diagnosis Date    Anxiety      Arthritis      Asthma      CHF (congestive heart failure) ([address]       x2 episodes, last episode     CKD (chronic kidney disease)       Per medical record    Cold intolerance       Pt states she develops edema in her face, ears, hands, and feet with exposure to cold temperatures.  COPD (chronic obstructive pulmonary disease) ([address]      Diabetes mellitus ([address]      Heat intolerance       Pt states she develops edema in her face, ears, hands, and feet with exposure to heat. 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 5-6 years ago (Written 2020). No stents placed per pt.      CATARACT REMOVAL WITH IMPLANT Bilateral       SECTION         x2    COLONOSCOPY        FRACTURE SURGERY Right       ankle    GASTRIC BYPASS SURGERY        HERNIA REPAIR         abdominal x2    HYSTERECTOMY        OTHER SURGICAL HISTORY         benign skin lesions removed from face    ROTATOR CUFF REPAIR Left      SMALL INTESTINE SURGERY         gangrenous bowel    TONSILLECTOMY AND ADENOIDECTOMY                Medications Prior to Admission:      Home Medications           Prior to Admission medications    Medication Sig Start Date End Date Taking? Authorizing Provider   potassium chloride (MICRO-K) 10 MEQ extended release capsule Take 10 mEq by mouth daily     Yes Historical Provider, MD   Ascorbic Acid (VITAMIN C) 1000 MG tablet Take 2,000 mg by mouth daily Takes (2) 1000mg tablets     Yes Historical Provider, MD   metFORMIN (GLUCOPHAGE-XR) 500 MG extended release tablet Take 500 mg by mouth daily (with breakfast)     Yes Historical Provider, MD   enalapril (VASOTEC) 20 MG tablet Take 20 mg by mouth daily     Yes Historical Provider, MD   montelukast (SINGULAIR) 10 MG tablet Take 10 mg by mouth daily     Yes Historical Provider, MD   sertraline (ZOLOFT) 25 MG tablet Take 12.5 mg by mouth daily Takes (1/2) of a 25 mg tablet     Yes Historical Provider, MD   aspirin 325 MG tablet Take 325 mg by mouth daily     Yes Historical Provider, MD   amLODIPine (NORVASC) 10 MG tablet Take 10 mg by mouth daily     Yes Historical Provider, MD   furosemide (LASIX) 20 MG tablet Take 20 mg by mouth daily     Yes Historical Provider, MD   cloNIDine (CATAPRES) 0.2 MG tablet Take 0.2 mg by mouth 2 times daily     Yes Historical Provider, MD            Allergies:      Red dye; Adhesive tape; Codeine; Keflex [cephalexin]; Kiwi extract; Other; Oxycodone; and Tetracyclines & related     Social History:      Tobacco:    reports that she quit smoking about 8 years ago. She has never used smokeless tobacco.  Alcohol:      reports previous alcohol use. Drug Use:  reports no history of drug use. Safety: Recommended a shower chair. Pt voiced understanding. Family History:      Family History         Family History   Problem Relation Age of Onset    Heart Disease Father      Heart Attack Paternal Grandmother              Review of Systems:      Positive and Negative as described in HPI. CONSTITUTIONAL: Negative for fevers, chills, sweats, fatigue, and weight loss. HEENT: Pt wears glasses. Sinus drainage.  Negative for hearing changes, rhinorrhea, and throat pain. RESPIRATORY: Dry cough. Negative for shortness of breath, congestion, and wheezing. CARDIOVASCULAR: Pt states her heart stopped for 1 minute 5-6 years ago. Negative for chest pain, blood clot, irregular heartbeat, and palpitations. GASTROINTESTINAL: Negative for reflux, nausea, vomiting, diarrhea, constipation, change in bowel habits, and abdominal pain. GENITOURINARY: Negative for difficulty of urination, burning with urination, and frequency. INTEGUMENT: Negative for rash, skin lesions, and easy bruising. Instructed pt to call Dr. Najma Cortés as soon as possible if a rash or wound develops prior to surgery. Pt voiced understanding. HEMATOLOGIC/LYMPHATIC: Right knee edema. Right ankle edema. ALLERGIC/IMMUNOLOGIC: Negative for urticaria and itching. ENDOCRINE: Diabetes. Last A1C was 6.2% per pt. Heat and cold intolerance. Pt states she is allergic to extreme hot or cold temperatures. Exposure to extreme temperatures causes edema in her face, ears, hands, and feet. Negative for increase in thirst and increase in urination. MUSCULOSKELETAL: See HPI. NEUROLOGICAL: Negative for headaches, dizziness, lightheadedness, numbness, and tingling extremities. BEHAVIOR/PSYCH: Treated anxiety. Negative for depression. Physical Exam:   /70   Pulse 87   Resp 16   Ht 5' 5\" (1.651 m)   Wt 229 lb (103.9 kg)   SpO2 97%   BMI 38.11 kg/m²   No LMP recorded. Patient has had a hysterectomy. No obstetric history on file. No results for input(s): POCGLU in the last 72 hours. General Appearance:  Alert, well appearing, and in no acute distress. Obese. Mental status: Oriented to person, place, and time. Head: Normocephalic and atraumatic. Eye: No icterus, redness, pupils equal and reactive, extraocular eye movements intact, and conjunctiva clear. Ear: Hearing grossly intact. Nose: No drainage noted.   Mouth: Mucous membranes moist.  Neck: Supple and no carotid bruits noted. Lungs: Very diminished throughout. Poor air exchange. No wheezing, rales or rhonchi. Normal effort. Cardiovascular: Distant heart sounds. Normal rate, regular rhythm, no murmur, gallop, or rub. Abdomen: Soft, non-tender, non-distended, and active bowel sounds. Neurologic: Normal speech and cranial nerves II through XII grossly intact. Strength 5/5 bilaterally. Skin: No gross lesions, rashes, bruising, or bleeding on exposed skin area. Extremities: Bilateral knee tenderness and edema. Moderate bilateral lower leg non-pitting edema. Antalgic gait. Pt is ambulating with a cane. Decreased range of motion in the right ankle. Posterior tibial pulses 2+ bilaterally. No calf tenderness with palpation. Psych: Normal affect.       Investigations:       Laboratory Testing:  Recent Results         Recent Results (from the past 24 hour(s))   CBC Auto Differential     Collection Time: 02/06/20  9:05 AM   Result Value Ref Range     WBC 7.2 3.5 - 11.3 k/uL     RBC 5.47 (H) 3.95 - 5.11 m/uL     Hemoglobin 14.6 11.9 - 15.1 g/dL     Hematocrit 47.7 (H) 36.3 - 47.1 %     MCV 87.2 82.6 - 102.9 fL     MCH 26.7 25.2 - 33.5 pg     MCHC 30.6 28.4 - 34.8 g/dL     RDW 14.3 11.8 - 14.4 %     Platelets 399 724 - 369 k/uL     MPV 10.6 8.1 - 13.5 fL     NRBC Automated NOT REPORTED 0.0 per 100 WBC     Differential Type NOT REPORTED       WBC Morphology NOT REPORTED       RBC Morphology NOT REPORTED       Platelet Estimate NOT REPORTED       Seg Neutrophils 59 36 - 65 %     Lymphocytes 30 24 - 43 %     Monocytes 9 3 - 12 %     Eosinophils % 2 1 - 4 %     Basophils 0 0 - 2 %     Immature Granulocytes 0 0 %     Segs Absolute 4.21 1.50 - 8.10 k/uL     Absolute Lymph # 2.13 1.10 - 3.70 k/uL     Absolute Mono # 0.67 0.10 - 1.20 k/uL     Absolute Eos # 0.13 0.00 - 0.44 k/uL     Basophils Absolute 0.03 0.00 - 0.20 k/uL     Absolute Immature Granulocyte 0.01 0.00 - 0.30 k/uL   Comprehensive Metabolic Panel     Collection Time: 20  9:05 AM   Result Value Ref Range     Glucose 123 (H) 70 - 99 mg/dL     BUN 24 (H) 8 - 23 mg/dL     CREATININE 0.96 (H) 0.50 - 0.90 mg/dL     Bun/Cre Ratio 25 (H) 9 - 20     Calcium 9.4 8.6 - 10.4 mg/dL     Sodium 135 135 - 144 mmol/L     Potassium 5.0 3.7 - 5.3 mmol/L     Chloride 101 98 - 107 mmol/L     CO2 21 20 - 31 mmol/L     Anion Gap 13 9 - 17 mmol/L     Alkaline Phosphatase 70 35 - 104 U/L     ALT 10 5 - 33 U/L     AST 15 <32 U/L     Total Bilirubin 0.15 (L) 0.3 - 1.2 mg/dL     Total Protein 7.5 6.4 - 8.3 g/dL     Alb 3.8 3.5 - 5.2 g/dL     Albumin/Globulin Ratio NOT REPORTED 1.0 - 2.5     GFR Non-African American 58 (L) >60 mL/min     GFR African American >60 >60 mL/min     GFR Comment            GFR Staging NOT REPORTED     TYPE AND SCREEN     Collection Time: 20  9:05 AM   Result Value Ref Range     Expiration Date 2020,2359       Arm Band Number [id number]       ABO/Rh A POSITIVE       Antibody Screen NEGATIVE                  Recent Labs     20  0905   HGB 14.6   HCT 47.7*   WBC 7.2   MCV 87.2      K 5.0      CO2 21   BUN 24*   CREATININE 0.96*   GLUCOSE 123*   AST 15   ALT 10   LABALBU 3.8         EK2020. See paper chart. Chest X-ray: Pending. Diagnosis:       1. Right knee osteoarthritis and osteoporosis     Plans:      1.  Right total knee arthroplasty        ROBY Solares CNP  2020  11:55 AM               Cosigned by: Karl Medrano MD at 2/10/2020 10:18 AM   Revision History     Date/Time User Provider Type Action   2/10/2020 10:18 AM Karl Medrano MD Physician Cosign   2020  3:12 PM ROBY Solares CNP Nurse Practitioner Sign   2020 11:56 AM ROBY Solares CNP Nurse Practitioner Incomplete Revision   2020 11:50 AM Flonnie Checo, APRN - CNP Nurse Practitioner Sign   View Details Report      Routing History

## 2020-02-25 NOTE — PROGRESS NOTES
Physical Therapy    Facility/Department: [facility]  Initial Assessment    NAME: Stefano Turner  : 1950  MRN: [id number]    Date of Service: 2020    Discharge Recommendations:  Home with assist PRN, Home with Home health PT, Continue to assess pending progress      Assessment   Body structures, Functions, Activity limitations: Decreased functional mobility ; Decreased strength;Decreased endurance;Decreased ROM; Decreased balance;Decreased safe awareness    Patient tolerated session well with moderate deficits noted in bed mobility, transfers, ambulation, balance, and endurance this session. Patient's R LE numbness and decreased strength are limiting factors with mobility at this time and patient demos significant deficits as compared to prior level of function. At current level of function, patient will benefit from continued inpatient PT services and will likely be safe to return home with assist as needed and home PT to promote improved safety and IND with all functional mobility after discharge. Decision Making: Low Complexity  PT Education: Goals;Precautions;PT Role;Plan of Care;Home Exercise Program;General Safety; Functional Mobility Training;Gait Training;Equipment  REQUIRES PT FOLLOW UP: Yes  Activity Tolerance  Activity Tolerance: Patient Tolerated treatment well;Patient limited by endurance       Patient Diagnosis(es): There were no encounter diagnoses. has a past medical history of Anxiety, Arthritis, Asthma, CHF (congestive heart failure) ([address] Cold intolerance, COPD (chronic obstructive pulmonary disease) ([address] Diabetes mellitus ([address] Disease of blood and blood forming organ, Heat intolerance, History of blood transfusion, History of fracture of right ankle, Hypertension, and Sickle cell anemia ([address] has a past surgical history that includes other surgical history; Hysterectomy; fracture surgery (Right);  Tonsillectomy and adenoidectomy; hernia repair; Cataract removal with implant (Bilateral); Small intestine surgery;  section; Gastric bypass surgery; Colonoscopy; Rotator cuff repair (Left); Cardiac catheterization; Abdomen surgery; and Total knee arthroplasty (Right, 2020).     Restrictions  Restrictions/Precautions  Restrictions/Precautions: Fall Risk, Weight Bearing  Required Braces or Orthoses?: Yes(R LE immobilizer)  Upper Extremity Weight Bearing Restrictions  Right Upper Extremity Weight Bearing: Weight Bearing As Tolerated  Position Activity Restriction  Other position/activity restrictions: RUE IV, CHERYL VAC, up to BS chair TID, B knee high TEds, may shower, amb in room and progress to ley with AD 4x/day, elevate op extemity, elevate heels off bed, BRP with assist, TKA precautions  Vision/Hearing  Vision: Within Functional Limits  Hearing: Within functional limits     Subjective  General  Patient assessed for rehabilitation services?: Yes  Family / Caregiver Present: No  Follows Commands: Within Functional Limits  Subjective  Subjective: Patient states that her right knee is numb  Pain Screening  Patient Currently in Pain: Denies  Vital Signs  Patient Currently in Pain: Denies       Orientation  Orientation  Overall Orientation Status: Within Functional Limits  Social/Functional History  Social/Functional History  Lives With: Family(3 grand sons and pt states they are home during the day and supportive(24, 24, 21 yrs old))  Type of Home: House  Home Layout: Able to Live on Main level with bedroom/bathroom, Laundry in basement(1.5 SH )  Home Access: Stairs to enter without rails(back door )  Entrance Stairs - Number of Steps: 2 plus 1   Bathroom Shower/Tub: Tub/Shower unit  Bathroom Toilet: Handicap height  Bathroom Equipment: (no DME )  Bathroom Accessibility: Walker accessible  Home Equipment: Rolling walker, [employer] Help From: Family(pt states she has a supportive family )  ADL Assistance: Independent  Homemaking Assistance: Needs assistance(pt states family does all laundry/meals and cleaning )  Homemaking Responsibilities: No  Ambulation Assistance: Independent(with cane )  Transfer Assistance: Independent  Active : Yes  Occupation: Retired  Type of occupation:    Leisure & Hobbies: paint   Additional Comments: Pt denies recent falls. Objective     Observation/Palpation  Observation: RUE IV, CHERYL VAC. Pt's states her RLE is still very numb and she feels like she is standing on a sponge -- immobilizer donned to increase safey/reduce buckling. Scar: TKA incision covered with dressing     AROM RLE (degrees)  RLE AROM: WFL  RLE General AROM: Grossly WFL throughout hip and ankle; knee ROM = -7 to 68 degrees  AROM LLE (degrees)  LLE AROM : WFL  LLE General AROM: Genu valgus noted in L LE  AROM RUE (degrees)  RUE AROM : WFL  AROM LUE (degrees)  LUE AROM : WFL  Strength RLE  Strength RLE: WFL  Comment: Grossly 4/5 throughout  Strength LLE  Strength LLE: WFL  Comment: Grossly 4/5 throughout  Strength RUE  Strength RUE: WFL  Comment: Grossly 4/5 throughout  Strength LUE  Strength LUE: WFL  Comment: Grossly 4/5 throughout  Tone RLE  RLE Tone: Normotonic  Tone LLE  LLE Tone: Normotonic  Motor Control  Gross Motor?: WFL  Sensation  Overall Sensation Status: WFL(Patient reports R LE numbness)  Bed mobility  Supine to Sit: Minimal assistance  Sit to Supine: Minimal assistance  Scooting: Minimal assistance  Comment: Assist mainly for R LE  Transfers  Sit to Stand: Moderate Assistance;2 Person Assistance  Stand to sit: Moderate Assistance;2 Person Assistance  Comment: Patient with numbness in R LE requiring use of immobilizer for standing today  Ambulation  Ambulation?: Yes  WB Status: WBAT R LE  Ambulation 1  Surface: level tile  Device: Rolling Walker  Other Apparatus: Knee Immobilizer  Assistance:  Moderate assistance;2 Person assistance  Quality of Gait: Gait limited by numbness in R LE -- side-stepped 7 small steps toward [facility] with mod A of 2 and RW  Distance: 5 feet  Stairs/Curb  Stairs?: No     Balance  Posture: Good  Sitting - Static: Good  Sitting - Dynamic: Fair;+  Standing - Static: Fair;+  Standing - Dynamic: Fair  Exercises  Comments: R LE exercises per TKA protocol with verbal cues for instruction     Plan   Plan  Times per week: Twice a day, 7 days a week  Current Treatment Recommendations: Strengthening, Transfer Training, Endurance Training, ROM, Balance Training, Gait Training, Functional Mobility Training, Stair training  Safety Devices  Type of devices: All fall risk precautions in place, Call light within reach, Gait belt, Left in chair, Nurse notified      AM-PAC Score  AM-PAC Inpatient Mobility Raw Score : 11 (02/25/20 1552)  AM-PAC Inpatient T-Scale Score : 33.86 (02/25/20 1552)  Mobility Inpatient CMS 0-100% Score: 72.57 (02/25/20 1552)  Mobility Inpatient CMS G-Code Modifier : CL (02/25/20 1552)          Goals  Short term goals  Time Frame for Short term goals: 10 visits  Short term goal 1: Patient will demo IND bed mobility to prevent complications of immobility  Short term goal 2: Patient will demo IND transfers to promote safe access to commode and bathroom  Short term goal 3: Patient will amb IND with appropriate device on level surfaces without LOB to promote safe negotiation of home environment  Short term goal 4: Patient will demo 'good' sitting and standing balance to promote increased safety with all standing activities  Short term goal 5: Patient will ascend/descend 3 stairs with rail to promote safe entry and exit from home  Patient Goals   Patient goals :  To get back home and heal so she can do second knee surgery       Therapy Time   Individual Concurrent Group Co-treatment   Time In 1345         Time Out 1425         Minutes [address] [facility],[address], [address] [facility]

## 2020-02-25 NOTE — ANESTHESIA PRE PROCEDURE
Department of Anesthesiology  Preprocedure Note       Name:  Demetrius Bryan   Age:  71 y.o.  :  1950                                          MRN:  [id number]         Date:  2020      Surgeon: Alejandra Rodriguez):  Amy Teague MD    Procedure: RIGHT KNEE TOTAL ARTHROPLASTY - DEPUY STEMMED COMPONENTS (Right )    Medications prior to admission:   Prior to Admission medications    Medication Sig Start Date End Date Taking?  Authorizing Provider   potassium chloride (MICRO-K) 10 MEQ extended release capsule Take 10 mEq by mouth daily   Yes Historical Provider, MD   Ascorbic Acid (VITAMIN C) 1000 MG tablet Take 2,000 mg by mouth daily Takes (2) 1000mg tablets   Yes Historical Provider, MD   metFORMIN (GLUCOPHAGE-XR) 500 MG extended release tablet Take 500 mg by mouth daily (with breakfast)   Yes Historical Provider, MD   enalapril (VASOTEC) 20 MG tablet Take 20 mg by mouth daily   Yes Historical Provider, MD   montelukast (SINGULAIR) 10 MG tablet Take 10 mg by mouth daily   Yes Historical Provider, MD   sertraline (ZOLOFT) 25 MG tablet Take 12.5 mg by mouth daily Takes (1/2) of a 25 mg tablet   Yes Historical Provider, MD   amLODIPine (NORVASC) 10 MG tablet Take 10 mg by mouth daily   Yes Historical Provider, MD   furosemide (LASIX) 20 MG tablet Take 20 mg by mouth daily   Yes Historical Provider, MD   cloNIDine (CATAPRES) 0.2 MG tablet Take 0.2 mg by mouth 2 times daily   Yes Historical Provider, MD   aspirin 325 MG tablet Take 325 mg by mouth daily    Historical Provider, MD       Current medications:    Current Facility-Administered Medications   Medication Dose Route Frequency Provider Last Rate Last Dose    clindamycin (CLEOCIN) 900 mg in dextrose 5 % 50 mL IVPB  900 mg Intravenous Once Amy Teague MD        lactated ringers infusion   Intravenous Continuous Krishna Siu  mL/hr at 20 0645      lidocaine 1% (buffered) injection 0.5 mL  0.5 mL Infiltration Once Krishna Siu MD  fentaNYL (SUBLIMAZE) injection 25 mcg  25 mcg Intravenous Q5 Min PRN Zulma Sexton MD        HYDROmorphone (DILAUDID) injection 0.25 mg  0.25 mg Intravenous Q5 Min PRN Zulma Sexton MD        ondansetron [facility]) injection 4 mg  4 mg Intravenous Once PRN Zulma Sexton MD        acetaminophen (TYLENOL) tablet 1,000 mg  1,000 mg Oral Once Zulma Sexton MD        gabapentin (NEURONTIN) capsule 300 mg  300 mg Oral Once Zulma Sexton MD           Allergies: Allergies   Allergen Reactions    Red Dye Anaphylaxis     Red Dye #40    Adhesive Tape      Paper tape works best    Codeine Other (See Comments)     hallucinations    Keflex [Cephalexin] Swelling    Kiwi Extract Hives and Swelling    Other Hives and Swelling     [place]anger[place]  Exposure to heat or cold causes swelling in her face, ears, hands, and feet.  Oxycodone Other (See Comments)     hallucinations    Tetracyclines & Related Hives       Problem List:  There is no problem list on file for this patient. Past Medical History:        Diagnosis Date    Anxiety     Arthritis     Asthma     CHF (congestive heart failure) ([facility])     x2 episodes, last episode 2010    CKD (chronic kidney disease)     Per medical record    Cold intolerance     Pt states she develops edema in her face, ears, hands, and feet with exposure to cold temperatures.  COPD (chronic obstructive pulmonary disease) ([address]     Diabetes mellitus ([address]     Heat intolerance     Pt states she develops edema in her face, ears, hands, and feet with exposure to heat. 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 5-6 years ago (Written 02/06/2020). No stents placed per pt.      CATARACT REMOVAL WITH IMPLANT Bilateral      SECTION      x2    COLONOSCOPY      FRACTURE SURGERY Right     ankle    GASTRIC BYPASS SURGERY      HERNIA REPAIR      abdominal x2    HYSTERECTOMY      OTHER SURGICAL HISTORY      benign skin lesions removed from face    ROTATOR CUFF REPAIR Left     SMALL INTESTINE SURGERY      gangrenous bowel    TONSILLECTOMY AND ADENOIDECTOMY         Social History:    Social History     Tobacco Use    Smoking status: Former Smoker     Last attempt to quit: 2012     Years since quittin.1    Smokeless tobacco: Never Used   Substance Use Topics    Alcohol use: Not Currently     Frequency: Never                                Counseling given: Not Answered      Vital Signs (Current):   Vitals:    20 0622 20 0700 20 0705 20 0710   BP:  (!) 141/113 121/73 120/67   Pulse:  78 84 74   Resp:  24 12 18   Temp:       TempSrc:       SpO2:  95% 93% 96%   Weight: 229 lb (103.9 kg)      Height: 5' 5\" (1.651 m)                                                 BP Readings from Last 3 Encounters:   20 120/67   20 126/70       NPO Status: Time of last liquid consumption:                         Time of last solid consumption:                         Date of last liquid consumption: 20                        Date of last solid food consumption: 20    BMI:   Wt Readings from Last 3 Encounters:   20 229 lb (103.9 kg)   20 229 lb (103.9 kg)     Body mass index is 38.11 kg/m².     CBC:   Lab Results   Component Value Date    WBC 7.2 2020    RBC 5.47 2020    HGB 14.6 2020    HCT 47.7 2020    MCV 87.2 2020    RDW 14.3 2020     2020       CMP:   Lab Results   Component Value Date     2020    K 5.0 2020     2020    CO2 21 2020    BUN 24 2020    CREATININE 0.96 2020    GFRAA >60 2020    LABGLOM 58 2020    GLUCOSE 123 2020    PROT 7.5 2020 CALCIUM 9.4 02/06/2020    BILITOT 0.15 02/06/2020    ALKPHOS 70 02/06/2020    AST 15 02/06/2020    ALT 10 02/06/2020       POC Tests: No results for input(s): POCGLU, POCNA, POCK, POCCL, POCBUN, POCHEMO, POCHCT in the last 72 hours. Coags: No results found for: PROTIME, INR, APTT    HCG (If Applicable): No results found for: PREGTESTUR, PREGSERUM, HCG, HCGQUANT     ABGs: No results found for: PHART, PO2ART, IEE2LPS, JYF7OJZ, BEART, K0MOQPKW     Type & Screen (If Applicable):  No results found for: LABABO, LABRH    Anesthesia Evaluation   no history of anesthetic complications:   Airway: Mallampati: III  TM distance: >3 FB     Mouth opening: > = 3 FB Dental:          Pulmonary:normal exam    (+) COPD:  asthma:     (-) not a current smoker                           Cardiovascular:    (+) hypertension:, CHF: diastolic,     (-)  angina          Echocardiogram reviewed  Stress test reviewed       Beta Blocker:  Not on Beta Blocker         Neuro/Psych:   Negative Neuro/Psych ROS              GI/Hepatic/Renal: Neg GI/Hepatic/Renal ROS  (+) GERD:,           Endo/Other:    (+) Diabetes, . Abdominal:           Vascular: negative vascular ROS. Anesthesia Plan      spinal, regional and TIVA     ASA 3       Induction: intravenous. MIPS: Postoperative opioids intended and Prophylactic antiemetics administered. Anesthetic plan and risks discussed with patient. Use of blood products discussed with patient whom consented to blood products. Plan discussed with CRNA.     Attending anesthesiologist reviewed and agrees with Nancy Salazar MD   2/25/2020

## 2020-02-25 NOTE — ANESTHESIA PROCEDURE NOTES
Spinal Block    Start time: 2/25/2020 7:43 AM  End time: 2/25/2020 7:56 AM  Reason for block: primary anesthetic  Staffing  Resident/CRNA: ROBY Miranda CRNA  Performed: resident/CRNA   Preanesthetic Checklist  Completed: patient identified, site marked, surgical consent, pre-op evaluation, timeout performed, IV checked, risks and benefits discussed, monitors and equipment checked, anesthesia consent given, oxygen available and patient being monitored  Spinal Block  Patient position: sitting  Prep: Betadine  Patient monitoring: continuous pulse ox and frequent blood pressure checks  Approach: midline  Location: L3/L4  Provider prep: mask and sterile gloves  Local infiltration: lidocaine  Dose: 0.5  Agent: bupivacaine  Adjuvant: fentanyl  Dose: 2  Dose: 2  Needle  Needle type: Pencan   Needle gauge: 24 G  Assessment  Sensory level: T6  Events: cerebrospinal fluid  Swirl obtained: Yes  CSF: clear  Attempts: 2  Hemodynamics: stable

## 2020-02-25 NOTE — PROGRESS NOTES
Writer paged  of pts chart stating allergies (halluinications) to oxycodone. Dr Mely Loco gave new orders from Tramadol Prn. Educated pt on taking Tylenol and tramadol to get effective pain management.

## 2020-02-25 NOTE — CONSULTS
[facility] / HISTORY AND PHYSICAL EXAMINATION            Date:   2/25/2020  Patient name:  Augustine Linares  Date of admission:  2/25/2020  5:38 AM  MRN:   [id number]  Account:  [de-identified]  YOB: 1950  PCP:    Kyle Orr MD  Room:   2023/2023-01  Code Status:    Full Code    Physician Requesting Consult: Giovani Meza MD    Reason for Consult:  Medical management     Chief Complaint:     Knee pain     History Obtained From:     patient, electronic medical record    History of Present Illness:     71 F with hx of DM, HTN who presented for elective R TKA per orthopedic surgery. Patient states she has been having knee pain going on for the past 5 years. Pain worse with walking. IM consulted post op to assist with medical management. Patient has history of DM on metformin, CHF, HTN. Denies any dyspnea, chest pain. No recent fever/chills, nausea/vomiting. Past Medical History:     Past Medical History:   Diagnosis Date    Anxiety     Arthritis     Asthma     CHF (congestive heart failure) ([address]     x2 episodes, last episode 2010    Cold intolerance     Pt states she develops edema in her face, ears, hands, and feet with exposure to cold temperatures.  COPD (chronic obstructive pulmonary disease) (HCC)     Diabetes mellitus ([address]     Disease of blood and blood forming organ     mediterranean Sickle cell (remission)    Heat intolerance     Pt states she develops edema in her face, ears, hands, and feet with exposure to heat.    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 5-6 years ago (Written 2020). No stents placed per pt.  CATARACT REMOVAL WITH IMPLANT Bilateral      SECTION      x2    COLONOSCOPY      FRACTURE SURGERY Right     ankle    GASTRIC BYPASS SURGERY      HERNIA REPAIR      abdominal x2    HYSTERECTOMY      OTHER SURGICAL HISTORY      benign skin lesions removed from face    ROTATOR CUFF REPAIR Left     SMALL INTESTINE SURGERY      gangrenous bowel    TONSILLECTOMY AND ADENOIDECTOMY      TOTAL KNEE ARTHROPLASTY Right 2020    RIGHT KNEE TOTAL ARTHROPLASTY - DEPUY STEMMED COMPONENTS performed by Angelic Clayton MD at [facility]        Medications Prior to Admission:     Prior to Admission medications    Medication Sig Start Date End Date Taking? Authorizing Provider   potassium chloride (MICRO-K) 10 MEQ extended release capsule Take 10 mEq by mouth daily   Yes Historical Provider, MD   Ascorbic Acid (VITAMIN C) 1000 MG tablet Take 2,000 mg by mouth daily Takes (2) 1000mg tablets   Yes Historical Provider, MD   metFORMIN (GLUCOPHAGE-XR) 500 MG extended release tablet Take 500 mg by mouth daily (with breakfast)   Yes Historical Provider, MD   enalapril (VASOTEC) 20 MG tablet Take 20 mg by mouth daily   Yes Historical Provider, MD   montelukast (SINGULAIR) 10 MG tablet Take 10 mg by mouth daily   Yes Historical Provider, MD   sertraline (ZOLOFT) 25 MG tablet Take 12.5 mg by mouth daily Takes (1/2) of a 25 mg tablet   Yes Historical Provider, MD   amLODIPine (NORVASC) 10 MG tablet Take 10 mg by mouth daily   Yes Historical Provider, MD   furosemide (LASIX) 20 MG tablet Take 20 mg by mouth daily   Yes Historical Provider, MD   cloNIDine (CATAPRES) 0.2 MG tablet Take 0.2 mg by mouth 2 times daily   Yes Historical Provider, MD   aspirin 325 MG tablet Take 325 mg by mouth daily    Historical Provider, MD        Allergies:     Red dye; Adhesive tape; Codeine; Keflex [cephalexin]; Kiwi extract;  Other; Oxycodone; and Tetracyclines & related    Social History:     Tobacco: reports that she quit smoking about 8 years ago. She has never used smokeless tobacco.  Alcohol:      reports previous alcohol use. Drug Use:  reports no history of drug use. Family History:     Family History   Problem Relation Age of Onset    Heart Disease Father     Heart Attack Paternal Grandmother        Review of Systems:     Positive and Negative as described in HPI. CONSTITUTIONAL:  negative for fevers, chills  HEENT:  negative for vision, hearing changes, runny nose, throat pain  RESPIRATORY:  negative for shortness of breath, cough, congestion, wheezing  CARDIOVASCULAR:  negative for chest pain, palpitations  GASTROINTESTINAL:  negative for nausea, vomiting, abdominal pain  GENITOURINARY:  negative for difficulty of urination, burning with urination, frequency   INTEGUMENT:  negative for rash, skin lesions, easy bruising  HEMATOLOGIC/LYMPHATIC:  negative for swelling/edema  ALLERGIC/IMMUNOLOGIC:  negative for urticaria , itching  ENDOCRINE:  negative increase in drinking, increase in urination, hot or cold intolerance  MUSCULOSKELETAL:  Positive for knee pain   NEUROLOGICAL:  negative for headaches, dizziness, lightheadedness, numbness, pain, tingling extremities  BEHAVIOR/PSYCH:  negative for depression, anxiety    Physical Exam:     /64   Pulse 80   Temp 98.4 °F (36.9 °C) (Oral)   Resp 16   Ht 5' 5\" (1.651 m)   Wt 229 lb (103.9 kg)   SpO2 92%   BMI 38.11 kg/m²   Temp (24hrs), Av.8 °F (36 °C), Min:93.7 °F (34.3 °C), Max:98.4 °F (36.9 °C)    Recent Labs     20  0643 20  1632   POCGLU 134* 165*       Intake/Output Summary (Last 24 hours) at 2020 1654  Last data filed at 2020 1138  Gross per 24 hour   Intake 1035 ml   Output 100 ml   Net 935 ml       General Appearance:  alert, well appearing, and in no acute distress  Mental status: oriented to person, place, and time with normal affect  Head:  normocephalic, atraumatic.   Eye: no icterus, redness, pupils equal and reactive, extraocular eye movements intact, conjunctiva clear  Ear: normal external ear, no discharge, hearing intact  Nose:  no drainage noted  Mouth: mucous membranes moist  Neck: supple, no carotid bruits, thyroid not palpable  Lungs: Bilateral equal air entry, clear to ausculation, no wheezing  Cardiovascular: normal rate, regular rhythm  Abdomen: Soft, nontender, nondistended, normal bowel sounds  Neurologic: There are no new focal motor or sensory deficits, normal muscle tone and bulk, no abnormal sensation, normal speech, cranial nerves II through XII grossly intact  Skin: No gross lesions, rashes, bruising or bleeding on exposed skin area  Extremities:  peripheral pulses palpable, dressing on R knee  Psych: normal affect     Investigations:      Laboratory Testing:  Recent Results (from the past 24 hour(s))   POC Glucose Fingerstick    Collection Time: 02/25/20  6:43 AM   Result Value Ref Range    POC Glucose 134 (H) 65 - 105 mg/dL   POC Glucose Fingerstick    Collection Time: 02/25/20  4:32 PM   Result Value Ref Range    POC Glucose 165 (H) 65 - 105 mg/dL       Imaging/Diagonstics:  Xr Knee Right (1-2 Views)    Result Date: 2/25/2020  Expected immediate postoperative findings status post knee arthroplasty.        Assessment :      Hospital Problems           Last Modified POA    * (Principal) Primary osteoarthritis of right knee (Chronic) 2/25/2020 Yes    Type 2 diabetes mellitus without complication, without long-term current use of insulin ([address] 2/25/2020 Yes    Essential hypertension 2/25/2020 Yes          Plan:     - Labs and home medications reviewed  - Hold home lasix while on IVF  - Resume home BP medications  - Resume home DM medications  - Insulin correction scale  - Pain control  - PT/OT as tolerated  - DC planning per primary    Thank you for consult, call with questions      Consultations:   Donte Reaves MD  2/25/2020  4:54 PM    Copy sent to Dr. Cristina Machado MD

## 2020-02-25 NOTE — OP NOTE
replacement with stemmed components. After discussion of the particulars of surgery, risks and benefits as well as alternative treatments, I believe that all of their questions were answered to their satisfaction. Informed consent is now given to proceed with surgery as discussed. Procedure  After proper patient identification and marking of the surgical site in the preoperative area, and following update of the history and physical examination, the patient was brought to the operative suite and placed on the table in supine position. The patient was identified as [address] [place] [address]. An adequate anesthetic was induced. A tourniquet was applied to the thigh and then the lower extremity was prepped and draped free in the usual sterile fashion. After marking the skin, the knife was taken and the skin was incised in the anterior midline at the knee, deepening the incision through subcutaneous tissue to deep retinaculum. Minimal flap development was undertaken. A medial capsulotomy was made, not violating the quadriceps tendon, and the patella was tipped laterally to inspect the joint surfaces. At this time, meticulous hemostasis was undertaken. There was advanced tricompartmental degenerative change, warranting knee replacement as planned. Therefore, attention was first directed to the patella. The patellar articular surface was removed with a saw, and the patella was then sized for resurfacing and subluxated laterally. Attention was directed to the femur. The femoral cutting guide was used to resect the femur at the templated valgus angle and then the femoral guides were used to indicate the #4 size. Next the remaining anterior and posterior as well as chamfer and femoral notch cuts were made, and the stem reamers were used to prepare the femoral canal for stemming. The stemmed trial femoral component was then fitted and noted to seat adequately. Now attention was directed to the tibia.  The tibial cutting

## 2020-02-25 NOTE — PROGRESS NOTES
Occupational Therapy   Occupational Therapy Initial Assessment  Date: 2020   Patient Name: Tyrone Fuentes  MRN: [id number]     : 1950    RN Kim Melendrez reports patient is medically stable for therapy treatment this date. Chart reviewed prior to treatment and patient is agreeable for therapy. All lines intact and patient positioned comfortably at end of treatment. All patient needs addressed prior to ending therapy session. Date of Service: 2020    Discharge Recommendations:  Home with Home health OT, Continue to assess pending progress, Home with assist PRN  OT Equipment Recommendations  Equipment Needed: Yes  Mobility Devices: ADL Assistive Devices  ADL Assistive Devices: Long-handled Sponge;Emergency Alert System; Reacher;Long-handled Shoe Horn;Toileting - 3-in-1 Commode;Grab Bars - shower    Assessment   Performance deficits / Impairments: Decreased functional mobility ; Decreased endurance;Decreased ADL status; Decreased safe awareness;Decreased high-level IADLs;Decreased balance  Assessment: OT is recommended for this pt to increase overall I and safety awareness with ADL and functional tasks to return home with family assist as needed. Prognosis: Good  Decision Making: Medium Complexity  OT Education: OT Role;Plan of Care;Transfer Training  Patient Education: DME and AE recommendations, call light use/fall prevention, safety awareness in function, postural control and weight shifting, bed mob tech, MD orders, EC/WS tech   REQUIRES OT FOLLOW UP: Yes  Activity Tolerance  Activity Tolerance: Patient limited by fatigue(limited due to RLE numbness )  Activity Tolerance: poor plus/fair minus  Safety Devices  Safety Devices in place: Yes  Type of devices: Bed alarm in place;Call light within reach; Left in bed;Patient at risk for falls;Gait belt;Nurse notified(RLE elevated and pillow under to off heel to promote good skin integrity )           Patient Diagnosis(es): There were no encounter diagnoses. has a past medical history of Anxiety, Arthritis, Asthma, CHF (congestive heart failure) ([facility] [address] Cold intolerance, COPD (chronic obstructive pulmonary disease) ([facility] [address] Diabetes mellitus ([facility] [address] Disease of blood and blood forming organ, Heat intolerance, History of blood transfusion, History of fracture of right ankle, Hypertension, and Sickle cell anemia ([facility] [address] has a past surgical history that includes other surgical history; Hysterectomy; fracture surgery (Right); Tonsillectomy and adenoidectomy; hernia repair; Cataract removal with implant (Bilateral); Small intestine surgery;  section; Gastric bypass surgery; Colonoscopy; Rotator cuff repair (Left); Cardiac catheterization; Abdomen surgery; and Total knee arthroplasty (Right, 2020). PER H&P: This is Ellen Palomo a 71 y.o. female who presents for a pre-admission testing appointment for an upcoming right total knee arthroplasty by Dr. Odilon Estrada on 2020 at 0730 due to right knee osteoarthritis and osteoporosis. The patient's chief complaint is constant, 6-10/10 right knee pain that has progressively worsened over the past 5 years.  Right knee pain is aggravated by walking; and is minimally relieved with rest. The right knee gives out, swells, and has decreased range of motion. Prior treatment includes right knee injections.  Denies recent falls and injuries.          Restrictions  Restrictions/Precautions  Restrictions/Precautions: Fall Risk, Weight Bearing  Required Braces or Orthoses?: Yes(R LE immobilizer)  Upper Extremity Weight Bearing Restrictions  Right Upper Extremity Weight Bearing: Weight Bearing As Tolerated  Position Activity Restriction  Other position/activity restrictions: RUE IV, CHERYL VAC, up to BS chair TID, B knee high TEds, may shower, amb in room and progress to ley with AD 4x/day, elevate op extemity, elevate heels off bed, BRP with assist, TKA precautions    Subjective   General  Chart Reviewed: Yes  Patient assessed for rehabilitation services?: Yes  Family / Caregiver Present: No  Patient Currently in Pain: Denies    Social/Functional History  Social/Functional History  Lives With: Family(3 grand sons and pt states they are home during the day and supportive(24, 24, 21 yrs old))  Type of Home: House  Home Layout: Able to Live on Main level with bedroom/bathroom, Laundry in basement(1.5 SH )  Home Access: Stairs to enter without rails(back door )  Entrance Stairs - Number of Steps: 2 plus 1   Bathroom Shower/Tub: Tub/Shower unit  Bathroom Toilet: Handicap height  Bathroom Equipment: (no DME )  Bathroom Accessibility: Walker accessible  Home Equipment: Rolling walker, Cane  Receives Help From: Family(pt states she has a supportive family )  ADL Assistance: Independent  Homemaking Assistance: Needs assistance(pt states family does all laundry/meals and cleaning )  Homemaking Responsibilities: No  Ambulation Assistance: Independent(with cane )  Transfer Assistance: Independent  Active : Yes  Occupation: Retired  Type of occupation:    Leisure & Hobbies: paint   Additional Comments: Pt denies recent falls. Objective   Vision: Within Functional Limits  Hearing: Within functional limits    Orientation  Overall Orientation Status: Within Functional Limits  Observation/Palpation  Posture: Fair(with RW )  Observation: RUE IV, CHERYL VAC. Pt's states her RLE is still very numb and she feels like she is standing on a sponge -- R LE immobilizer donned to increase safey/reduce buckling. Edema: RLE; pt had LLE Marcelino hose on upon arrival   Scar: TKA incision covered with dressing   Balance  Sitting Balance: Stand by assistance  Standing Balance:  Moderate assistance(Mod to min assist of 2 with use of RW for safety/balance and RLE numbness.  )  Standing Balance  Time: stand amanda 1-1.5 mins with RW for functional tasks   Functional Mobility  Functional - Mobility Device: Rolling Cognition  Overall Cognitive Status: Exceptions  Arousal/Alertness: Appropriate responses to stimuli  Following Commands: Follows all commands without difficulty  Attention Span: Appears intact  Memory: Appears intact  Safety Judgement: Decreased awareness of need for safety  Problem Solving: Assistance required to identify errors made;Assistance required to correct errors made;Decreased awareness of errors  Insights: Decreased awareness of deficits  Initiation: Requires cues for some  Sequencing: Requires cues for some  Perception  Overall Perceptual Status: WFL     Sensation  Overall Sensation Status: WFL(Patient reports R LE numbness)        LUE AROM (degrees)  LUE AROM : WFL  RUE AROM (degrees)  RUE AROM : WFL  LUE Strength  Gross LUE Strength: WFL  LUE Strength Comment: BUE strength grossly 4 plus/5   RUE Strength  Gross RUE Strength: WFL                   Plan   Plan  Times per week: 5-6x/week 1-2x/day as amanda   Current Treatment Recommendations: Strengthening, Balance Training, Functional Mobility Training, Safety Education & Training, Home Management Training, Pain Management, Self-Care / ADL, Equipment Evaluation, Education, & procurement, Endurance Training, Neuromuscular Re-education, Patient/Caregiver Education & Training                                                  AM-PAC Score   16          Goals  Short term goals  Time Frame for Short term goals: by discharge, pt to demo   Short term goal 1: bed mob tasks with use of rail as needed to MOD I. Short term goal 2: I with BUE HEP with hand outs as needed to maintain strength for functional tasks. Short term goal 3: UB ADL to set up and LB ADL to min assist with use of AD/AE as needed. Short term goal 4: toileting tasks with use of AD/grab bar as needed to SBA. Short term goal 5: ADL transfers and functional mob with AD as needed to SBA level.     Long term goals  Long term goal 1: Pt to stand with SUP with AD as needed amanda > 7 mins as able to reduce fall risk with self care. Long term goal 2: Pt/family to be I with EC/WS and fall prevention tech as well as DME/AE recommendations as needed with use of hand outs. Patient Goals   Patient goals : return home       *Co-treatment with PT warranted first time up day of surgery. Cotx due to potential risk of decreased sensation, muscle control and proprioception from spinal epidural and/or regional block. Decreased safety and independence requiring 2 skilled therapy professionals to address individual discipline's goals.      Therapy Time   Individual Concurrent Group Co-treatment   Time In 1345(plus 10 min chart review/RN communication )         Time Out 1423         Minutes 38         Timed Code Treatment Minutes: 22 Minutes       Verenice Anderson, OT

## 2020-02-25 NOTE — BRIEF OP NOTE
Brief Postoperative Note  ______________________________________________________________    Patient: Demetrius Bryan  YOB: 1950  MRN: [id number]  Date of Procedure: 2/25/2020    Pre-Op Diagnosis: RIGHT KNEE OA AND OSTEOPOROSIS    Post-Op Diagnosis: Same       Procedure(s):  RIGHT KNEE TOTAL ARTHROPLASTY - DEPUY STEMMED COMPONENTS    Anesthesia: Regional, TIVA, Spinal    Surgeon(s):  Amy Teague MD    Assistant: None    Estimated Blood Loss (mL): 969     Complications: None    Specimens:   * No specimens in log *    Implants:  Implant Name Type Inv.  Item Serial No.  Lot No. LRB No. Used   CEMENT HI VISCOSITY SMARTSET 40GR Cement CEMENT HI VISCOSITY SMARTSET 40GR  [employer]: [employer] [id number] Right 1   CEMENT HI VISCOSITY SMARTSET 40GR Cement CEMENT HI VISCOSITY SMARTSET 40GR  [employer]: [employer] [id number] Right 1   IMPL KNEE TIB BASE REV FB NOLBERTO ATTUNE SZ5 Knee IMPL KNEE TIB BASE REV FB NOLBERTO ATTUNE SZ5  [employer]: [employer] [id number] Right 1   IMPL KNEE PATELLA MEDIALIZED NOLBERTO 32MM Knee IMPL KNEE PATELLA MEDIALIZED NOLBERTO 32MM  [employer]: [employer] [id number] Right 1   IMPL KNEE ATTUNE REV NOLBERTO STEM [id number] Knee IMPL KNEE ATTUNE REV NOLBERTO STEM [id number]  [employer]: [employer] [id number] Right 1   IMPL KNEE FEM CRS REV NOLBERTO SZ4 RT ATTU Knee IMPL KNEE FEM CRS REV NOLBERTO SZ4 RT ATTU  [employer]: [employer] [id number] Right 1   IMPL KNEE ATTUNE REV NOLBERTO STEM [id number] Knee IMPL KNEE ATTUNE REV NOLBERTO STEM [id number]  [employer]: [employer] [id number] Right 1   ATTUNE KNEE SYSTEM REVISION CRS FIXED BEARING INSERT SIZE 4 8MM AOX     [id number] Right 1         Drains: * No LDAs found *    Findings: Severe OA with instability, genu valgum and offset alignment, from severe joint collapse and osteoporosis    Amy Teague MD  Date: 2/25/2020  Time: 10:29 AM

## 2020-02-25 NOTE — ANESTHESIA PROCEDURE NOTES
Peripheral Block    Patient location during procedure: pre-op  Start time: 2/25/2020 7:00 AM  End time: 2/25/2020 7:13 AM  Staffing  Anesthesiologist: German Claude, MD  Performed: anesthesiologist   Preanesthetic Checklist  Completed: patient identified, site marked, surgical consent, pre-op evaluation, timeout performed, IV checked, risks and benefits discussed, monitors and equipment checked, anesthesia consent given, oxygen available and patient being monitored  Peripheral Block  Patient position: supine  Prep: ChloraPrep  Patient monitoring: cardiac monitor, continuous pulse ox, frequent blood pressure checks and IV access  Block type: Femoral (and ipac block)  Laterality: right  Injection technique: single-shot  Procedures: ultrasound guided  Local infiltration: lidocaine (20ml of 0.5% ropiviciane for adductor with 4 mg decadron and 10ml of 0.5% bupivicaine, for the ipic 10ml of 0.5% ropivicaine plain)  Infiltration strength: 1 %  Dose: 4 mL  Adductor canal  Provider prep: sterile gloves and mask  Local infiltration: lidocaine (20ml of 0.5% ropiviciane for adductor with 4 mg decadron and 10ml of 0.5% bupivicaine, for the ipic 10ml of 0.5% ropivicaine plain)  Needle  Needle type: pencil-tip   Needle gauge: 20 G  Needle localization: ultrasound guidance  Assessment  Injection assessment: negative aspiration for heme, no paresthesia on injection and local visualized surrounding nerve on ultrasound  Paresthesia pain: none  Slow fractionated injection: yes  Hemodynamics: stable  Additional Notes  Needle visualized under ultrasound throughout, no heme throughout, no parethsesia, no immediate complications    Reason for block: procedure for pain, post-op pain management and at surgeon's request

## 2020-02-25 NOTE — PROGRESS NOTES
Ortho Face-to-Face Discussion of Medical Necessity for Use of Assistive Device after Joint Replacement Surgery    I discussed today, face to face, the patient's mobility needs after their joint replacement operation. The patient will require the use of a walker now and for at least 30 days continuously because of impaired gait and the necessity of joint support for safe ambulation after joint replacement surgery.

## 2020-02-26 VITALS
TEMPERATURE: 97.6 F | SYSTOLIC BLOOD PRESSURE: 127 MMHG | WEIGHT: 229 LBS | DIASTOLIC BLOOD PRESSURE: 69 MMHG | HEART RATE: 88 BPM | OXYGEN SATURATION: 96 % | RESPIRATION RATE: 16 BRPM | BODY MASS INDEX: 38.15 KG/M2 | HEIGHT: 65 IN

## 2020-02-26 PROBLEM — M17.11 PRIMARY OSTEOARTHRITIS OF RIGHT KNEE: Chronic | Status: RESOLVED | Noted: 2020-02-25 | Resolved: 2020-02-26

## 2020-02-26 LAB
GLUCOSE BLD-MCNC: 139 MG/DL (ref 65–105)
GLUCOSE BLD-MCNC: 155 MG/DL (ref 65–105)

## 2020-02-26 PROCEDURE — 97535 SELF CARE MNGMENT TRAINING: CPT

## 2020-02-26 PROCEDURE — 6370000000 HC RX 637 (ALT 250 FOR IP): Performed by: ORTHOPAEDIC SURGERY

## 2020-02-26 PROCEDURE — 82947 ASSAY GLUCOSE BLOOD QUANT: CPT

## 2020-02-26 PROCEDURE — 97116 GAIT TRAINING THERAPY: CPT

## 2020-02-26 PROCEDURE — 97110 THERAPEUTIC EXERCISES: CPT

## 2020-02-26 PROCEDURE — 97530 THERAPEUTIC ACTIVITIES: CPT

## 2020-02-26 PROCEDURE — 6370000000 HC RX 637 (ALT 250 FOR IP): Performed by: NURSE PRACTITIONER

## 2020-02-26 PROCEDURE — 99213 OFFICE O/P EST LOW 20 MIN: CPT | Performed by: INTERNAL MEDICINE

## 2020-02-26 PROCEDURE — 2580000003 HC RX 258: Performed by: ORTHOPAEDIC SURGERY

## 2020-02-26 RX ORDER — TRAMADOL HYDROCHLORIDE 50 MG/1
TABLET ORAL
Qty: 112 TABLET | Refills: 0 | Status: SHIPPED | OUTPATIENT
Start: 2020-02-26 | End: 2020-03-11

## 2020-02-26 RX ADMIN — ACETAMINOPHEN 650 MG: 325 TABLET ORAL at 08:08

## 2020-02-26 RX ADMIN — ACETAMINOPHEN 650 MG: 325 TABLET ORAL at 14:18

## 2020-02-26 RX ADMIN — SODIUM CHLORIDE, POTASSIUM CHLORIDE, SODIUM LACTATE AND CALCIUM CHLORIDE: 600; 310; 30; 20 INJECTION, SOLUTION INTRAVENOUS at 02:25

## 2020-02-26 RX ADMIN — POLYETHYLENE GLYCOL (3350) 17 G: 17 POWDER, FOR SOLUTION ORAL at 08:09

## 2020-02-26 RX ADMIN — METFORMIN HYDROCHLORIDE 500 MG: 500 TABLET, EXTENDED RELEASE ORAL at 08:09

## 2020-02-26 RX ADMIN — TRAMADOL HYDROCHLORIDE 50 MG: 50 TABLET, FILM COATED ORAL at 12:14

## 2020-02-26 RX ADMIN — ACETAMINOPHEN 650 MG: 325 TABLET ORAL at 02:22

## 2020-02-26 RX ADMIN — TRAMADOL HYDROCHLORIDE 50 MG: 50 TABLET, FILM COATED ORAL at 08:09

## 2020-02-26 RX ADMIN — AMLODIPINE BESYLATE 10 MG: 10 TABLET ORAL at 08:09

## 2020-02-26 RX ADMIN — TRAMADOL HYDROCHLORIDE 50 MG: 50 TABLET, FILM COATED ORAL at 02:22

## 2020-02-26 RX ADMIN — POTASSIUM CHLORIDE 10 MEQ: 750 TABLET, FILM COATED, EXTENDED RELEASE ORAL at 08:09

## 2020-02-26 RX ADMIN — CLONIDINE HYDROCHLORIDE 0.2 MG: 0.2 TABLET ORAL at 00:56

## 2020-02-26 RX ADMIN — ENALAPRIL MALEATE 20 MG: 20 TABLET ORAL at 08:09

## 2020-02-26 RX ADMIN — ASPIRIN 325 MG: 325 TABLET, DELAYED RELEASE ORAL at 08:09

## 2020-02-26 RX ADMIN — MONTELUKAST SODIUM 10 MG: 10 TABLET ORAL at 08:09

## 2020-02-26 RX ADMIN — MELATONIN TAB 3 MG 3 MG: 3 TAB at 00:11

## 2020-02-26 RX ADMIN — INSULIN LISPRO 1 UNITS: 100 INJECTION, SOLUTION INTRAVENOUS; SUBCUTANEOUS at 08:09

## 2020-02-26 RX ADMIN — SERTRALINE HYDROCHLORIDE 12.5 MG: 25 TABLET, FILM COATED ORAL at 08:10

## 2020-02-26 RX ADMIN — CLONIDINE HYDROCHLORIDE 0.2 MG: 0.2 TABLET ORAL at 08:09

## 2020-02-26 ASSESSMENT — PAIN DESCRIPTION - PAIN TYPE
TYPE: SURGICAL PAIN

## 2020-02-26 ASSESSMENT — PAIN DESCRIPTION - DESCRIPTORS
DESCRIPTORS: ACHING;CRAMPING

## 2020-02-26 ASSESSMENT — PAIN SCALES - GENERAL
PAINLEVEL_OUTOF10: 10
PAINLEVEL_OUTOF10: 8
PAINLEVEL_OUTOF10: 0
PAINLEVEL_OUTOF10: 10
PAINLEVEL_OUTOF10: 10
PAINLEVEL_OUTOF10: 8
PAINLEVEL_OUTOF10: 9
PAINLEVEL_OUTOF10: 9

## 2020-02-26 ASSESSMENT — PAIN DESCRIPTION - PROGRESSION
CLINICAL_PROGRESSION: NOT CHANGED

## 2020-02-26 ASSESSMENT — PAIN DESCRIPTION - FREQUENCY
FREQUENCY: CONTINUOUS

## 2020-02-26 ASSESSMENT — PAIN DESCRIPTION - ORIENTATION
ORIENTATION: RIGHT

## 2020-02-26 ASSESSMENT — PAIN DESCRIPTION - ONSET
ONSET: ON-GOING

## 2020-02-26 ASSESSMENT — PAIN - FUNCTIONAL ASSESSMENT: PAIN_FUNCTIONAL_ASSESSMENT: PREVENTS OR INTERFERES SOME ACTIVE ACTIVITIES AND ADLS

## 2020-02-26 ASSESSMENT — PAIN DESCRIPTION - DIRECTION
RADIATING_TOWARDS: RT THIGH
RADIATING_TOWARDS: RIGHT THIGH
RADIATING_TOWARDS: RT THIGH

## 2020-02-26 ASSESSMENT — PAIN DESCRIPTION - LOCATION
LOCATION: KNEE

## 2020-02-26 NOTE — PROGRESS NOTES
Occupational Therapy  Facility/Department: [facility]  Daily Treatment Note  NAME: Marlen Alvarado  : 1950  MRN: [id number]    [address] [place] [address] reports patient is medically stable for therapy treatment this date. Chart reviewed prior to treatment and patient is agreeable for therapy. All lines intact and patient positioned comfortably at end of treatment. All patient needs addressed prior to ending therapy session. Date of Service: 2020    Discharge Recommendations:  Subacute/Skilled Nursing Facility  OT Equipment Recommendations  Equipment Needed: Yes  ADL Assistive Devices: Long-handled Sponge;Emergency Alert System; Reacher;Long-handled Shoe Horn;Toileting - 3-in-1 Commode;Grab Bars - shower    Assessment   Performance deficits / Impairments: Decreased functional mobility ; Decreased endurance;Decreased ADL status; Decreased safe awareness;Decreased high-level IADLs;Decreased balance  Assessment: OT is recommended for this pt to increase overall I and safety awareness with ADL and functional tasks to return home with family assist as needed. Pt with increased struggle this date and required max to min assist for ADL transfers and fluctuated this session especially with low surface ones. Recommend continued therapy at DC and SNF for rehab to improve pt's I/safety and reduce fall risk. Writer recommends pt to not return home at this time unless family can provide extensive assist and 24 hr care.     Prognosis: Good  OT Education: OT Role;Plan of Care;Transfer Training  Patient Education: DME and AE benefits and recommendations, call light use/fall prevention, safety awareness in function, postural control and weight shifting, modified bed mob tech, compensatory tech, EC/WS tech   REQUIRES OT FOLLOW UP: Yes  Activity Tolerance  Activity Tolerance: Patient Tolerated treatment well;Patient limited by fatigue;Patient limited by pain  Activity Tolerance: fair minus   Safety Devices  Type of devices: Call light within reach; Left in chair;Patient at risk for falls;Gait belt;Nurse notified(RLE elevated and pillow under heel to promote good skin integrity and to reduce swelling per MD order.  )         Patient Diagnosis(es): The encounter diagnosis was Primary osteoarthritis of right knee. has a past medical history of Anxiety, Arthritis, Asthma, CHF (congestive heart failure) ([facility] [address] Cold intolerance, COPD (chronic obstructive pulmonary disease) ([facility] [address] Diabetes mellitus ([facility] [address] Disease of blood and blood forming organ, Heat intolerance, History of blood transfusion, History of fracture of right ankle, Hypertension, and Sickle cell anemia ([facility] [address] has a past surgical history that includes other surgical history; Hysterectomy; fracture surgery (Right); Tonsillectomy and adenoidectomy; hernia repair; Cataract removal with implant (Bilateral); Small intestine surgery;  section; Gastric bypass surgery; Colonoscopy; Rotator cuff repair (Left); Cardiac catheterization; Abdomen surgery; and Total knee arthroplasty (Right, 2020).     Restrictions  Restrictions/Precautions  Restrictions/Precautions: Weight Bearing, Fall Risk  Required Braces or Orthoses?: Yes(R LE immobilizer)  Upper Extremity Weight Bearing Restrictions  Right Upper Extremity Weight Bearing: Weight Bearing As Tolerated  Position Activity Restriction  Other position/activity restrictions: RUE IV, CHERYL VAC, up to BS chair TID, B knee high TEds, may shower, amb in room and progress to ley with AD 4x/day, elevate op extemity, elevate heels off bed, BRP with assist, TKA precautions  Subjective   General  Chart Reviewed: Yes  Patient assessed for rehabilitation services?: Yes  Family / Caregiver Present: No  Pre Treatment Pain Screening  Intervention List: Nurse/Physician notified  Comments / Details: Pt c/o 10/10 pain from a RLE \"Trent horse\" and posterior knee pain; pt states she took pain meds prior to OT arrival and RN in room and aware of goals: by discharge, pt to demo   Short term goal 1: bed mob tasks with use of rail as needed to MOD I. Short term goal 2: I with BUE HEP with hand outs as needed to maintain strength for functional tasks. Short term goal 3: UB ADL to set up and LB ADL to min assist with use of AD/AE as needed. Short term goal 4: toileting tasks with use of AD/grab bar as needed to SBA. Short term goal 5: ADL transfers and functional mob with AD as needed to SBA level. Long term goals  Long term goal 1: Pt to stand with SUP with AD as needed amanda > 7 mins as able to reduce fall risk with self care. Long term goal 2: Pt/family to be I with EC/WS and fall prevention tech as well as DME/AE recommendations as needed with use of hand outs.     Patient Goals   Patient goals : return home        Therapy Time   Individual Concurrent Group Co-treatment   [facility]         Time Out 1004         Minutes [address] [facility], [facility]

## 2020-02-26 NOTE — CARE COORDINATION
[facility]win called from OP pharmacy and supplies for glucometer are not covered in pharmacy. Script given to patient to get filled.

## 2020-02-26 NOTE — CARE COORDINATION
Case Management Initial Discharge Plan  Kiah Palomo,             Met with:patient to discuss discharge plans. Information verified: address, contacts, phone number, , insurance Yes  PCP: Sera Cervantes MD  Date of last visit: 20    Insurance Provider: Medicare, [employer]    Discharge Planning    Living Arrangements:  3 grandsons   Support Systems:  Children, Friends/Neighbors    Home has 1 stories  3 stairs to climb to get into front door, 0 stairs to climb to reach second floor  Location of bedroom/bathroom in home - main floor    Patient able to perform ADL's:Independent    Current Services (outpatient & in home) none  DME equipment: walker  DME provider: N/A    Ph[facility]acy: [facility]    Potential Assistance Needed:  Home Care, glucometer and supplies    Patient agreeable to home care: Yes  Freedom of choice provided:  yes    Prior SNF/Rehab Placement and Facility: No  Agreeable to SNF/Rehab: No  [place] of choice provided: n/a   Evaluation: n/a    Expected Discharge date:  20  Patient expects to be discharged to:  home  Follow Up Appointment: Best Day/ Time:      Transportation provider: smith  Transportation arrangements needed for discharge: No    Readmission Risk              Risk of Unplanned Readmission:        8             Does patient have a readmission risk score greater than 14?: No  If yes, follow-up appointment must be made within 7 days of discharge. Goal of Care:       Discharge Plan: Met with patient at bedside. Lives with 3 grandsons and had been independent. POD #1 rt total knee replacement. Has walker from home at bedside. Will go home on ASA 325mg BID for 14 days. Needs glucometer and supplies ordered. Post op appointment with Dr. Martha Parmar is 3-12-20 at 11:15am and pt informed.     The Plan for Transition of Care is related to the following treatment goals: SN, PT/OT S/P rt total knee replacement    The Patient was provided with a choice

## 2020-02-26 NOTE — PROGRESS NOTES
Ortho POD # 1 and Discharge    Feels fine, pain well controlled orally with Tramadol  Afebrile, Vital signs stable  Waterproof dressing intact; no skin redness at the site  Neurovascular findings normal, negative Naren sign  Okay for discharge today  Follow up as scheduled  Pain medication Rx and VTE prophylaxis Rx dispensed

## 2020-02-26 NOTE — FLOWSHEET NOTE
Writer rounding. Patient resting in bed dressed in street clothes. One visitor present. Patient engages in brief, pleasant converstiton. She expresses no spiritual or emotional needs. Writer offers supportive conversation, and patient expresses thanks. Spiritual Care will follow as needed.        02/26/20 1344   Encounter Summary   Services provided to: Patient and family together   Referral/Consult From: [facility] Family members   Continue Visiting   (2/26/20)   Complexity of Encounter Low   Length of Encounter 15 minutes   Spiritual Assessment Completed Yes   Routine   Type Initial   Assessment Approachable   Intervention Active listening   Outcome Engaged in conversation;Expressed gratitude

## 2020-02-26 NOTE — PROGRESS NOTES
Ortho Coordinator Daily Rounding Note    Admission Date:   2/25/2020 Oanh Marques is a 71 y.o.  female    Post Op Day # 1 RTKA    Labs:         No results for input(s): WBC, HGB, PLT in the last 72 hours. No results for input(s): NA, K, CL, CO2, BUN, CREATININE, GLUCOSE in the last 72 hours. Met with:     Patient  Patient's LOC:   alert and oriented X3  Patient progress   GOOD  Patient's Pain:   Patient rates pain 9  Oral Intake:    good  Output:    adequate   Perdomo in:   no  ON-Q:    no    Walker/DME:  Walker/DME needed:  NO  DME needed:    walker   DME Agency:    PT HAS [facility]    Anticoagulation:  Anticoagulation:  325MG ASA BID X 14 DAYS  Prescription in chart:  No/OTC AND PT HAD FILLED PRE-OP     Continuity of Care: The [address] form is on the chart. The [address] form is signed by the physician. Discharge Planning:  Confirmed with patient that discharge plan is Home. Agency/Facility [facility]: [facility]  Awaiting pre-certification no  Anticipated discharge date: 02/26/2020. Patient's questions and concerns were answered. Actively listened and provided reassurance.      Electronically signed by: Zeny Irene RN on 2/26/2020 at 9:30 AM

## 2020-02-26 NOTE — DISCHARGE SUMMARY
Pt discharged to home with all personal belongings in stable condition. AVS discussed and signed. Meds to Beds delivered prior to discharge. All questions and concerns discussed.

## 2020-02-26 NOTE — PROGRESS NOTES
CLINICAL PHARMACY NOTE: MEDS TO [address] [facility] Patient?: No  Total # of Prescriptions Filled: 1   The following medications were delivered to the patient:  · TRAMADOL 50MG  Total # of Interventions Completed: 1  Time Spent (min): 45    Additional Documentation:  PT BOUGHT OTC BOTTLE OF ASPIRIN 325 MG

## 2020-02-26 NOTE — PROGRESS NOTES
Physical Therapy  Facility/Department: [facility]  Daily Treatment Note  NAME: Kosta Carson  : 1950  MRN: [id number]    Date of Service: 2020    Discharge Recommendations:  Home with assist PRN, Home with Home health PT, Continue to assess pending progress        Assessment   Body structures, Functions, Activity limitations: Decreased functional mobility ; Decreased strength;Decreased endurance;Decreased ROM; Decreased balance;Decreased safe awareness  Assessment: Continued PT would be beneficial to pt to increase ROM and tolerance to amb for return to prior level of function. PT Education: Goals;Precautions;PT Role;Plan of Care;Home Exercise Program;General Safety; Functional Mobility Training;Gait Training;Equipment  Activity Tolerance  Activity Tolerance: Patient Tolerated treatment well;Patient limited by pain  Activity Tolerance: Pt started treatment with a high level of pain (9/10) but was able to complete in room ambulation and seated exercises without any increase in pain or change in the quality of the pain. Patient Diagnosis(es): The encounter diagnosis was Primary osteoarthritis of right knee. has a past medical history of Anxiety, Arthritis, Asthma, CHF (congestive heart failure) ([address] Cold intolerance, COPD (chronic obstructive pulmonary disease) ([address] Diabetes mellitus ([address] Disease of blood and blood forming organ, Heat intolerance, History of blood transfusion, History of fracture of right ankle, Hypertension, and Sickle cell anemia ([address] has a past surgical history that includes other surgical history; Hysterectomy; fracture surgery (Right); Tonsillectomy and adenoidectomy; hernia repair; Cataract removal with implant (Bilateral); Small intestine surgery;  section; Gastric bypass surgery; Colonoscopy; Rotator cuff repair (Left); Cardiac catheterization;  Abdomen surgery; and Total knee arthroplasty (Right, Good;-  Exercises  Comments: Seated heelslides, LAQ, ankle pumps, marches, glute sets x10. pt given education on completing quad sets while in bed. Goals  Short term goals  Time Frame for Short term goals: 10 visits  Short term goal 1: Patient will demo IND bed mobility to prevent complications of immobility  Short term goal 2: Patient will demo IND transfers to promote safe access to commode and bathroom  Short term goal 3: Patient will amb IND with appropriate device on level surfaces without LOB to promote safe negotiation of home environment  Short term goal 4: Patient will demo 'good' sitting and standing balance to promote increased safety with all standing activities  Short term goal 5: Patient will ascend/descend 3 stairs with rail to promote safe entry and exit from home  Patient Goals   Patient goals : To get back home and heal so she can do second knee surgery    Plan    Plan  Times per week: Twice a day, 7 days a week  Current Treatment Recommendations: Strengthening, Transfer Training, Endurance Training, ROM, Balance Training, Gait Training, Functional Mobility Training, Stair training  Safety Devices  Type of devices:  All fall risk precautions in place, Call light within reach, Gait belt, Nurse notified, Left in bed     Therapy Time   Individual Concurrent Group Co-treatment   Time In 1034         Time Out 1128         Minutes [address] [place] [address]

## 2020-02-26 NOTE — PLAN OF CARE
decrease  Description  Pain level will decrease  2/26/2020 1352 by Axel Boone RN  Outcome: Ongoing  Note:   Pain level assessment complete.    Patient educated on pain scale and control interventions  PRN pain medication given per patient request  Patient instructed to call out with new onset of pain or unrelieved pain    2/26/2020 0411 by Rigo Machado RN  Outcome: Ongoing  Goal: Control of acute pain  Description  Control of acute pain  2/26/2020 1352 by Axel Boone RN  Outcome: Ongoing  2/26/2020 0411 by Rigo Machado RN  Outcome: Ongoing  Goal: Control of chronic pain  Description  Control of chronic pain  2/26/2020 1352 by Axel Boone RN  Outcome: Ongoing  2/26/2020 0411 by Rigo Machado RN  Outcome: Ongoing

## 2020-02-26 NOTE — PROGRESS NOTES
[facility]    Progress Note    2/26/2020    11:47 AM    Name:   Oanh Marques  MRN:     [id number]     Acct:      [de-identified]   Room:   2023/2023-01  IP Day:  0  Admit Date:  2/25/2020  5:38 AM    PCP:   Estela Soria MD  Code Status:  Full Code    Subjective:     C/C: knee pain     Interval History Status: not changed. No acute issues overnight  Pain controlled  Dc planning today per primary     Brief History:     71 F with hx of DM, HTN who presented for elective R TKA per orthopedic surgery. Patient states she has been having knee pain going on for the past 5 years. Pain worse with walking. IM consulted post op to assist with medical management.      Patient has history of DM on metformin, CHF, HTN. Denies any dyspnea, chest pain. No recent fever/chills, nausea/vomiting. Review of Systems:     Constitutional:  negative for chills, fevers, sweats  Respiratory:  negative for cough, dyspnea on exertion, shortness of breath, wheezing  Cardiovascular:  negative for chest pain, chest pressure/discomfort  Gastrointestinal:  negative for abdominal pain, constipation, diarrhea, nausea, vomiting  Neurological:  negative for dizziness, headache    Medications: Allergies: Allergies   Allergen Reactions    Red Dye Anaphylaxis     Red Dye #40    Adhesive Tape      Paper tape works best    Codeine Other (See Comments)     hallucinations    Keflex [Cephalexin] Swelling    Kiwi Extract Hives and Swelling    Other Hives and Swelling     [place]  Exposure to heat or cold causes swelling in her face, ears, hands, and feet.     Oxycodone Other (See Comments)     hallucinations    Tetracyclines & Related Hives       Current Meds:   Scheduled Meds:    amLODIPine  10 mg Oral Daily    cloNIDine  0.2 mg Oral BID    metFORMIN  500 mg Oral Daily with breakfast    montelukast  10 mg Oral Daily    potassium chloride  10 mEq Oral Daily    sertraline  12.5 mg Oral Daily    acetaminophen  650 mg Oral 4 times per day    sodium chloride flush  10 mL Intravenous 2 times per day    polyethylene glycol  17 g Oral Daily    aspirin  325 mg Oral BID    enalapril  20 mg Oral Daily    insulin lispro  0-6 Units Subcutaneous TID     insulin lispro  0-3 Units Subcutaneous Nightly     Continuous Infusions:    lactated ringers 125 mL/hr at 20 0225    dextrose       PRN Meds: promethazine, sodium chloride flush, HYDROmorphone **OR** HYDROmorphone, famotidine, ondansetron **OR** ondansetron, dextrose, glucagon (rDNA), dextrose, traMADol, melatonin    Data:     Past Medical History:   has a past medical history of Anxiety, Arthritis, Asthma, CHF (congestive heart failure) ([facility] Cold intolerance, COPD (chronic obstructive pulmonary disease) ([facility] Diabetes mellitus [facility] Disease of blood and blood forming organ, Heat intolerance, History of blood transfusion, History of fracture of right ankle, Hypertension, and Sickle cell anemia ([facility] Social History:   reports that she quit smoking about 8 years ago. She has never used smokeless tobacco. She reports previous alcohol use. She reports that she does not use drugs. Family History:   Family History   Problem Relation Age of Onset    Heart Disease Father     Heart Attack Paternal Grandmother        Vitals:  /69   Pulse 88   Temp 97.6 °F (36.4 °C) (Oral)   Resp 16   Ht 5' 5\" (1.651 m)   Wt 229 lb (103.9 kg)   SpO2 96%   BMI 38.11 kg/m²   Temp (24hrs), Av.1 °F (36.7 °C), Min:97.5 °F (36.4 °C), Max:98.6 °F (37 °C)    Recent Labs     20  1632 20  2056 20  0636 20  1110   POCGLU 165* 190* 155* 139*       I/O (24Hr):     Intake/Output Summary (Last 24 hours) at 2020 1147  Last data filed at 2020 0810  Gross per 24 hour   Intake 2856.25 ml   Output 2150 ml   Net 706.25 ml       Labs:  Hematology:No results for input(s): WBC, RBC, HGB, HCT, MCV, MCH, MD  2/26/2020  11:47 AM

## 2020-02-26 NOTE — DISCHARGE SUMMARY
[facility]    [address] [facility], [facility] SUMMARY    Patient: Oanh Marques         Reg#: [id number]     YOB: 1950    Date of  Admission & Surgery: 2/25/2020 Date of Discharge: 2/26/2020    Attending Surgeon: Cruz Hartman M.D.  PCP: Estela Soria MD        History and Hospital Course    The patient has had severe pain and arthritis of the knee, unresponsive to conservative treatment and is now admitted for joint replacement. Medical evaluation and postoperative co-management is provided by the consultant medical staff. The patient underwent knee replacement on the day of admission, 2/25/2020. The operation was well tolerated by the patient, without any complication. Postoperatively the patient did well, was never grossly febrile, and was able to participate actively in a supervised program of physical therapy for gait training, transfers and joint range of motion. Postoperative hemoglobin remained stable, not requiring transfusion. Wound healing ensued without difficulty and without sign of infection. The patient experienced no other problem or complication during this admission and was considered a satisfactory candidate for discharge. Disposition    The patient was able to be discharged home postoperatively on 2/26/2020, POD # 1, in good condition, with instructions to maintain activity and participation in therapy with total joint precautions. Prescription was given for Tramadol 50 mg, one to two every 6h prn for pain, for later home use, and a specific regimen was prescribed for continued thromboembolic prophylaxis with Ecotrin 325 mg one twice daily x one month, after discharge. The patient was also advised to follow up in my office within 2 weeks for re-evaluation.     Final diagnosis: Osteoarthritis of the knee  Procedure performed: Right total knee replacement  Complications: None  Disposition: Home

## 2020-02-26 NOTE — PROGRESS NOTES
Physical Therapy  Facility/Department: [facility]  Daily Treatment Note  NAME: Amanda Hanson  : 1950  MRN: [id number]    Date of Service: 2020    Discharge Recommendations:  Home with assist PRN, Home with Home health PT   PT Equipment Recommendations  Equipment Needed: No    Assessment   Body structures, Functions, Activity limitations: Decreased functional mobility ; Decreased strength;Decreased endurance;Decreased ROM; Decreased balance;Decreased safe awareness  Assessment: Appropraite to return home w/ son and grandsons assist.  Prognosis: Good  PT Education: Goals;Precautions;PT Role;Plan of Care;Home Exercise Program;General Safety; Functional Mobility Training;Gait Training;Equipment  REQUIRES PT FOLLOW UP: Yes  Activity Tolerance  Activity Tolerance: Patient Tolerated treatment well;Patient limited by pain  Activity Tolerance: Pt started treatment with a high level of pain (9/10) but was able to complete in room ambulation and seated exercises without any increase in pain or change in the quality of the pain. Patient Diagnosis(es): The encounter diagnosis was Primary osteoarthritis of right knee. has a past medical history of Anxiety, Arthritis, Asthma, CHF (congestive heart failure) ([address] Cold intolerance, COPD (chronic obstructive pulmonary disease) ([address] Diabetes mellitus ([address] Disease of blood and blood forming organ, Heat intolerance, History of blood transfusion, History of fracture of right ankle, Hypertension, and Sickle cell anemia ([address] has a past surgical history that includes other surgical history; Hysterectomy; fracture surgery (Right); Tonsillectomy and adenoidectomy; hernia repair; Cataract removal with implant (Bilateral); Small intestine surgery;  section; Gastric bypass surgery; Colonoscopy; Rotator cuff repair (Left); Cardiac catheterization;  Abdomen surgery; and Total knee arthroplasty (Right, 2/25/2020). Restrictions  Restrictions/Precautions  Restrictions/Precautions: Weight Bearing, Fall Risk  Required Braces or Orthoses?: No  Upper Extremity Weight Bearing Restrictions  Right Upper Extremity Weight Bearing: Weight Bearing As Tolerated  Position Activity Restriction  Other position/activity restrictions: RUE IV, CHERYL VAC, up to BS chair TID, B knee high TEds, may shower, amb in room and progress to ley with AD 4x/day, elevate op extemity, elevate heels off bed, BRP with assist, TKA precautions  Subjective   General  Chart Reviewed: Yes  Family / Caregiver Present: No  Subjective  Subjective: Pt reports her pain as 9/10 due to a cramp in the medial thigh. Pt agrees to PT   Pain Screening  Patient Currently in Pain: Yes  Pain Assessment  Pain Assessment: 0-10  Pain Level: 0  Vital Signs  Patient Currently in Pain: Yes       Orientation  Orientation  Overall Orientation Status: Within Normal Limits  Cognition   Cognition  Overall Cognitive Status: Exceptions  Arousal/Alertness: Appropriate responses to stimuli  Following Commands: Follows all commands without difficulty  Attention Span: Appears intact  Memory: Decreased short term memory  Safety Judgement: Decreased awareness of need for safety;Decreased awareness of need for assistance  Problem Solving: Assistance required to identify errors made;Assistance required to correct errors made;Decreased awareness of errors  Insights: Decreased awareness of deficits  Initiation: Requires cues for some  Sequencing: Requires cues for some  Objective   Bed mobility  Bridging: Supervision(single leg, left)  Supine to Sit: Stand by assistance  Sit to Supine: Stand by assistance  Scooting: Supervision  Comment: Pt transitions from supine to long sitting then walked legs to the side of the bed, and used sheet as a leg lift. Pt was able scoot to the [facility] with left leg bridge and UE use.  sitting<>supine VCs to scoot back in the bed before bringing legs

## 2022-06-03 ENCOUNTER — HOSPITAL ENCOUNTER (OUTPATIENT)
Dept: PREADMISSION TESTING | Age: 72
Discharge: HOME | End: 2022-06-07
Payer: MEDICARE

## 2022-06-03 VITALS
WEIGHT: 222 LBS | SYSTOLIC BLOOD PRESSURE: 134 MMHG | OXYGEN SATURATION: 96 % | HEART RATE: 91 BPM | RESPIRATION RATE: 20 BRPM | DIASTOLIC BLOOD PRESSURE: 96 MMHG | HEIGHT: 65 IN | BODY MASS INDEX: 36.99 KG/M2 | TEMPERATURE: 97.2 F

## 2022-06-03 LAB
ABO/RH: NORMAL
ABSOLUTE EOS #: 0.2 K/UL (ref 0–0.44)
ABSOLUTE IMMATURE GRANULOCYTE: 0.04 K/UL (ref 0–0.3)
ABSOLUTE LYMPH #: 3.62 K/UL (ref 1.1–3.7)
ABSOLUTE MONO #: 0.76 K/UL (ref 0.1–1.2)
ALBUMIN SERPL-MCNC: 4.3 G/DL (ref 3.5–5.2)
ALP BLD-CCNC: 60 U/L (ref 35–104)
ALT SERPL-CCNC: 23 U/L (ref 5–33)
ANION GAP SERPL CALCULATED.3IONS-SCNC: 12 MMOL/L (ref 9–17)
ANTIBODY SCREEN: NEGATIVE
ARM BAND NUMBER: NORMAL
AST SERPL-CCNC: 26 U/L
BASOPHILS # BLD: 1 % (ref 0–2)
BASOPHILS ABSOLUTE: 0.04 K/UL (ref 0–0.2)
BILIRUB SERPL-MCNC: 0.2 MG/DL (ref 0.3–1.2)
BUN BLDV-MCNC: 33 MG/DL (ref 8–23)
BUN/CREAT BLD: 28 (ref 9–20)
CALCIUM SERPL-MCNC: 9.7 MG/DL (ref 8.6–10.4)
CHLORIDE BLD-SCNC: 101 MMOL/L (ref 98–107)
CO2: 26 MMOL/L (ref 20–31)
CREAT SERPL-MCNC: 1.18 MG/DL (ref 0.5–0.9)
EOSINOPHILS RELATIVE PERCENT: 2 % (ref 1–4)
ESTIMATED AVERAGE GLUCOSE: 180 MG/DL
EXPIRATION DATE: NORMAL
GFR AFRICAN AMERICAN: 55 ML/MIN
GFR NON-AFRICAN AMERICAN: 45 ML/MIN
GFR SERPL CREATININE-BSD FRML MDRD: ABNORMAL ML/MIN/{1.73_M2}
GLUCOSE BLD-MCNC: 139 MG/DL (ref 70–99)
HBA1C MFR BLD: 7.9 % (ref 4–6)
HCT VFR BLD CALC: 50.4 % (ref 36.3–47.1)
HEMOGLOBIN: 15.5 G/DL (ref 11.9–15.1)
IMMATURE GRANULOCYTES: 1 %
INR BLD: 1
LYMPHOCYTES # BLD: 41 % (ref 24–43)
MCH RBC QN AUTO: 26.5 PG (ref 25.2–33.5)
MCHC RBC AUTO-ENTMCNC: 30.8 G/DL (ref 28.4–34.8)
MCV RBC AUTO: 86 FL (ref 82.6–102.9)
MONOCYTES # BLD: 9 % (ref 3–12)
NRBC AUTOMATED: 0 PER 100 WBC
PDW BLD-RTO: 15.7 % (ref 11.8–14.4)
PLATELET # BLD: 294 K/UL (ref 138–453)
PMV BLD AUTO: 10.8 FL (ref 8.1–13.5)
POTASSIUM SERPL-SCNC: 4.6 MMOL/L (ref 3.7–5.3)
PROTHROMBIN TIME: 13 SEC (ref 11.5–14.2)
RBC # BLD: 5.86 M/UL (ref 3.95–5.11)
RBC # BLD: ABNORMAL 10*6/UL
SEG NEUTROPHILS: 46 % (ref 36–65)
SEGMENTED NEUTROPHILS ABSOLUTE COUNT: 4.21 K/UL (ref 1.5–8.1)
SODIUM BLD-SCNC: 139 MMOL/L (ref 135–144)
TOTAL PROTEIN: 7.8 G/DL (ref 6.4–8.3)
WBC # BLD: 8.9 K/UL (ref 3.5–11.3)

## 2022-06-03 PROCEDURE — 85610 PROTHROMBIN TIME: CPT

## 2022-06-03 PROCEDURE — 86901 BLOOD TYPING SEROLOGIC RH(D): CPT

## 2022-06-03 PROCEDURE — 93005 ELECTROCARDIOGRAM TRACING: CPT | Performed by: ORTHOPAEDIC SURGERY

## 2022-06-03 PROCEDURE — 80053 COMPREHEN METABOLIC PANEL: CPT

## 2022-06-03 PROCEDURE — 87088 URINE BACTERIA CULTURE: CPT

## 2022-06-03 PROCEDURE — 85025 COMPLETE CBC W/AUTO DIFF WBC: CPT

## 2022-06-03 PROCEDURE — 36415 COLL VENOUS BLD VENIPUNCTURE: CPT

## 2022-06-03 PROCEDURE — 86900 BLOOD TYPING SEROLOGIC ABO: CPT

## 2022-06-03 PROCEDURE — 87086 URINE CULTURE/COLONY COUNT: CPT

## 2022-06-03 PROCEDURE — 87186 SC STD MICRODIL/AGAR DIL: CPT

## 2022-06-03 PROCEDURE — 87641 MR-STAPH DNA AMP PROBE: CPT

## 2022-06-03 PROCEDURE — 86850 RBC ANTIBODY SCREEN: CPT

## 2022-06-03 PROCEDURE — 83036 HEMOGLOBIN GLYCOSYLATED A1C: CPT

## 2022-06-03 RX ORDER — LEVOTHYROXINE SODIUM 0.03 MG/1
0.03 TABLET ORAL DAILY
COMMUNITY

## 2022-06-03 RX ORDER — CLINDAMYCIN PHOSPHATE 900 MG/50ML
900 INJECTION INTRAVENOUS ONCE
Status: CANCELLED | OUTPATIENT
Start: 2022-06-27

## 2022-06-03 ASSESSMENT — PROMIS GLOBAL HEALTH SCALE
IN THE PAST 7 DAYS, HOW WOULD YOU RATE YOUR FATIGUE ON AVERAGE [ON A SCALE FROM 1 (NONE) TO 5 (VERY SEVERE)]?: 3
IN GENERAL, WOULD YOU SAY YOUR HEALTH IS...[ON A SCALE OF 1 (POOR) TO 5 (EXCELLENT)]: 3
SUM OF RESPONSES TO QUESTIONS 3, 6, 7, & 8: 15
IN GENERAL, HOW WOULD YOU RATE YOUR SATISFACTION WITH YOUR SOCIAL ACTIVITIES AND RELATIONSHIPS [ON A SCALE OF 1 (POOR) TO 5 (EXCELLENT)]?: 4
HOW IS THE PROMIS V1.1 BEING ADMINISTERED?: 0
IN GENERAL, WOULD YOU SAY YOUR QUALITY OF LIFE IS...[ON A SCALE OF 1 (POOR) TO 5 (EXCELLENT)]: 3
SUM OF RESPONSES TO QUESTIONS 2, 4, 5, & 10: 13
IN GENERAL, PLEASE RATE HOW WELL YOU CARRY OUT YOUR USUAL SOCIAL ACTIVITIES (INCLUDES ACTIVITIES AT HOME, AT WORK, AND IN YOUR COMMUNITY, AND RESPONSIBILITIES AS A PARENT, CHILD, SPOUSE, EMPLOYEE, FRIEND, ETC) [ON A SCALE OF 1 (POOR) TO 5 (EXCELLENT)]?: 4
IN THE PAST 7 DAYS, HOW WOULD YOU RATE YOUR PAIN ON AVERAGE [ON A SCALE FROM 0 (NO PAIN) TO 10 (WORST IMAGINABLE PAIN)]?: 7
IN GENERAL, HOW WOULD YOU RATE YOUR MENTAL HEALTH, INCLUDING YOUR MOOD AND YOUR ABILITY TO THINK [ON A SCALE OF 1 (POOR) TO 5 (EXCELLENT)]?: 4
TO WHAT EXTENT ARE YOU ABLE TO CARRY OUT YOUR EVERYDAY PHYSICAL ACTIVITIES SUCH AS WALKING, CLIMBING STAIRS, CARRYING GROCERIES, OR MOVING A CHAIR [ON A SCALE OF 1 (NOT AT ALL) TO 5 (COMPLETELY)]?: 3
IN THE PAST 7 DAYS, HOW OFTEN HAVE YOU BEEN BOTHERED BY EMOTIONAL PROBLEMS, SUCH AS FEELING ANXIOUS, DEPRESSED, OR IRRITABLE [ON A SCALE FROM 1 (NEVER) TO 5 (ALWAYS)]?: 2
IN GENERAL, HOW WOULD YOU RATE YOUR PHYSICAL HEALTH [ON A SCALE OF 1 (POOR) TO 5 (EXCELLENT)]?: 2

## 2022-06-03 ASSESSMENT — KOOS JR
KOOS JR TOTAL INTERVAL SCORE: 39.625
BENDING TO THE FLOOR TO PICK UP OBJECT: 2
HOW SEVERE IS YOUR KNEE STIFFNESS AFTER FIRST WAKING IN MORNING: 3
GOING UP OR DOWN STAIRS: 3
STRAIGHTENING KNEE FULLY: 2
TWISING OR PIVOTING ON KNEE: 3
RISING FROM SITTING: 3
STANDING UPRIGHT: 3

## 2022-06-03 ASSESSMENT — PAIN DESCRIPTION - LOCATION: LOCATION: KNEE

## 2022-06-03 ASSESSMENT — PAIN DESCRIPTION - DESCRIPTORS: DESCRIPTORS: ACHING;GNAWING;HEAVINESS

## 2022-06-03 ASSESSMENT — PAIN SCALES - GENERAL: PAINLEVEL_OUTOF10: 7

## 2022-06-03 ASSESSMENT — PAIN DESCRIPTION - ORIENTATION: ORIENTATION: LEFT

## 2022-06-03 ASSESSMENT — PAIN DESCRIPTION - PAIN TYPE: TYPE: CHRONIC PAIN

## 2022-06-03 ASSESSMENT — PAIN DESCRIPTION - FREQUENCY: FREQUENCY: INTERMITTENT

## 2022-06-03 NOTE — H&P
History and Physical Service   [facility]    HISTORY AND PHYSICAL EXAMINATION            Date of Evaluation: 6/3/2022  Patient name:  Alvarado Martinez  MRN:   [id number]  YOB: 1950  PCP:    Fitz Gutierres MD    History Obtained From:     Patient, medical records    History of Present Illness: This is Alvarado Martinez a 70 y.o. female who presents for a pre-admission testing appointment for an upcoming [address] by Dani Larios MD scheduled on 6/27/2022 @  0730 due to DX LEFT KNEE OA. The patient's chief complaint is 7-10 /10 the pain which has progressively worsened over the past 10 years  Progressively getting worse. THE pain is aggravated by ACTIVITY ESPECIALLY  STAIRS AND WALKING ANY DISTANCE AFTER ACTIVITY THE PAIN GETS WORSE LYING IN BED AT NIGHT. and is  NOT minimally relieved with ANYTHING. . Prior treatment includes INJECTIONS OF STEROIDS AND SYNVISC. WITH MINIMAL HELP. THE KNEE HAS GIVEN OUT . Iza Boothe Denies recent falls and injuries. SHE STATES \" IM VERY CAREFUL. \"    Sleep apnea questionnaire  1) Do you snore loudly? NO  2) Do you often feel tired, fatigued, or sleepy? YES  3) Has anyone observed you stop breathing or choking/gasping during your sleep? NO  4) Do you have hypertension? YES   5) BMI >35 kg/m2? YES 36.9. 6) Age > 48? YES   7) Pt is a male? NO    Functional Capacity:   1) Pt IS NOT  able to walk 2 city blocks on level ground without SOB. 2) Pt IS  able to climb 2 flights of stairs without SOB. 3) Pt IS NOT  able to walk up a hill for 1-2 city blocks without SOB. Past Medical History:     Past Medical History:   Diagnosis Date    Anxiety     Arthritis     Asthma     CHF (congestive heart failure) ([facility] [address]     x2 episodes, last episode 2010    Cold intolerance     Pt states she develops edema in her face, ears, hands, and feet with exposure to cold temperatures.     COPD [facility]ch[facility] obstructive [facility] disease) [facility] [address]     Diabetes mellitus [facility] [address]     Disease of blood and blood forming organ     mediterranean Sickle cell (remission)    Heat intolerance     Pt states she develops edema in her face, ears, hands, and feet with exposure to heat. 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 5-6 years ago (Written 2020). No stents placed per pt.  CATARACT REMOVAL WITH IMPLANT Bilateral      SECTION      x2    COLONOSCOPY      FRACTURE SURGERY Right     ankle    GASTRIC BYPASS SURGERY      HERNIA REPAIR      abdominal x2    HYSTERECTOMY      OTHER SURGICAL HISTORY      benign skin lesions removed from face    ROTATOR CUFF REPAIR Left     SMALL INTESTINE SURGERY      gangrenous bowel    TONSILLECTOMY AND ADENOIDECTOMY      TOTAL KNEE ARTHROPLASTY Right 2020    RIGHT KNEE TOTAL ARTHROPLASTY - DEPUY STEMMED COMPONENTS performed by Dahlia Hensley MD at [facility]        Medications Prior to Admission:     Prior to Admission medications    Medication Sig Start Date End Date Taking?  Authorizing Provider   levothyroxine (SYNTHROID) 25 MCG tablet Take 0.025 mcg by mouth Daily   Yes Historical Provider, MD   potassium chloride (MICRO-K) 10 MEQ extended release capsule Take 10 mEq by mouth daily    Historical Provider, MD   Ascorbic Acid (VITAMIN C) 1000 MG tablet Take 2,000 mg by mouth daily Takes (2) 1000mg tablets    Historical Provider, MD   metFORMIN (GLUCOPHAGE-XR) 500 MG extended release tablet Take 1,000 mg by mouth daily (with breakfast)     Historical Provider, MD   enalapril (VASOTEC) 20 MG tablet Take 20 mg by mouth daily    Historical Provider, MD   montelukast (SINGULAIR) 10 MG tablet Take 10 mg by mouth daily    Historical Provider, MD   sertraline (ZOLOFT) 25 MG tablet Take 12.5 mg by mouth daily Takes (1/2) of a 25 mg tablet    Historical Provider, MD   amLODIPine (NORVASC) 10 MG tablet Take 10 mg by mouth daily    Historical Provider, MD   furosemide (LASIX) 20 MG tablet Take 20 mg by mouth daily    Historical Provider, MD   cloNIDine (CATAPRES) 0.2 MG tablet Take 0.2 mg by mouth 2 times daily    Historical Provider, MD        Allergies:     Red dye, Adhesive tape, Codeine, Keflex [cephalexin], Kiwi extract, Other, Oxycodone, and Tetracyclines & related    Social History:     Tobacco:    reports that she quit smoking about 10 years ago. She has never used smokeless tobacco.  Alcohol:      reports previous alcohol use. Drug Use:  reports no history of drug use. Family History:     Family History   Problem Relation Age of Onset    Heart Disease Father     Heart Attack Paternal Grandmother        Review of Systems:     Positive and Negative as described in HPI. CONSTITUTIONAL:  Negative for fevers, chills, sweats, fatigue, and weight loss. HEENT: WEARS  glasses, hearing changes, rhinorrhea, and throat pain. RESPIRATORY: HAS ASTHMA WITH OCCASIONAL  shortness of breath, cough, congestion, and wheezing. CARDIOVASCULAR: NO  chest pain, blood clot, irregular heartbeat, and palpitations. HAS HYPERTENSION AND HAS HAD 2 EPISODES OF CONGESTIVE HEART FAILURE. GASTROINTESTINAL:  Negative for reflux, nausea, vomiting, diarrhea, constipation, change in bowel habits, and abdominal pain. GENITOURINARY:  Negative for difficulty of urination, burning with urination, and frequency. INTEGUMENT:  Negative for rash, skin lesions, and easy bruising. Instructed pt to call   as soon as possible if a rash or wound develops prior to surgery. Pt voiced understanding. HEMATOLOGIC/LYMPHATIC:  Negative for swelling/edema. ALLERGIC/IMMUNOLOGIC:  Negative for urticaria and itching.   ENDOCRINE: HAS DIABETES,  MONITORS BLOOD SUGARS AVERAGES 120-140 IN THE AM HAS HYPOTHYROIDISM. Ele Valdes MUSCULOSKELETAL: See HPI. NEUROLOGICAL:  Negative for headaches, dizziness, lightheadedness, numbness, and tingling extremities. BEHAVIOR/PSYCH: HAS BEEN TREATED FOR ANXIETY depression and anxiety. Physical Exam:   BP (!) 134/96   Pulse 91   Temp 97.2 °F (36.2 °C) (Temporal)   Resp 20   Ht 5' 5\" (1.651 m)   Wt 222 lb (100.7 kg)   SpO2 96%   BMI 36.94 kg/m²   No LMP recorded. Patient has had a hysterectomy. No obstetric history on file. No results for input(s): POCGLU in the last 72 hours. General Appearance:  Alert, well appearing, and in no acute distress. Mental status:  Oriented to person, place, and time. Head:  Normocephalic and atraumatic. Eye:  No icterus, redness, pupils equal and reactive, extraocular eye movements intact, and conjunctiva clear. Ear:  Hearing grossly intact. Nose:  No drainage noted. Mouth:  Mucous membranes moist.  Neck:  Supple and no carotid bruits noted. Lungs:  Bilateral equal air entry, clear to auscultation, no wheezing, rales or rhonchi, and normal effort. Cardiovascular:  Normal rate, regular rhythm, no murmur, gallop, or rub. Abdomen:  Soft, non-tender, non-distended, and active bowel sounds. Neurologic:  Normal speech and cranial nerves II through XII grossly intact. Strength 5/5 bilaterally. Skin:  No gross lesions, rashes, bruising, or bleeding on exposed skin area. Extremities:  Posterior tibial pulses 2+ bilaterally. No pedal edema. No calf tenderness with palpation. Psych:  Normal affect.      Investigations:      Laboratory Testing:  Recent Results (from the past 24 hour(s))   Comprehensive Metabolic Panel    Collection Time: 06/03/22  1:23 PM   Result Value Ref Range    Glucose 139 (H) 70 - 99 mg/dL    BUN 33 (H) 8 - 23 mg/dL    CREATININE 1.18 (H) 0.50 - 0.90 mg/dL    Bun/Cre Ratio 28 (H) 9 - 20    Calcium 9.7 8.6 - 10.4 mg/dL    Sodium 139 135 - 144 mmol/L    Potassium 4.6 3.7 - 5.3 mmol/L    Chloride 101 98 - 107 mmol/L    CO2 26 20 - 31 mmol/L    Anion Gap 12 9 - 17 mmol/L    Alkaline Phosphatase 60 35 - 104 U/L    ALT 23 5 - 33 U/L    AST 26 <32 U/L    Total Bilirubin 0.20 (L) 0.3 - 1.2 mg/dL    Total Protein 7.8 6.4 - 8.3 g/dL    Albumin 4.3 3.5 - 5.2 g/dL    GFR Non-African American 45 (L) >60 mL/min    GFR  55 (L) >60 mL/min    GFR Comment         CBC with Auto Differential    Collection Time: 06/03/22  1:23 PM   Result Value Ref Range    WBC 8.9 3.5 - 11.3 k/uL    RBC 5.86 (H) 3.95 - 5.11 m/uL    Hemoglobin 15.5 (H) 11.9 - 15.1 g/dL    Hematocrit 50.4 (H) 36.3 - 47.1 %    MCV 86.0 82.6 - 102.9 fL    MCH 26.5 25.2 - 33.5 pg    MCHC 30.8 28.4 - 34.8 g/dL    RDW 15.7 (H) 11.8 - 14.4 %    Platelets 807 728 - 344 k/uL    MPV 10.8 8.1 - 13.5 fL    NRBC Automated 0.0 0.0 per 100 WBC    Seg Neutrophils 46 36 - 65 %    Lymphocytes 41 24 - 43 %    Monocytes 9 3 - 12 %    Eosinophils % 2 1 - 4 %    Basophils 1 0 - 2 %    Immature Granulocytes 1 (H) 0 %    Segs Absolute 4.21 1.50 - 8.10 k/uL    Absolute Lymph # 3.62 1.10 - 3.70 k/uL    Absolute Mono # 0.76 0.10 - 1.20 k/uL    Absolute Eos # 0.20 0.00 - 0.44 k/uL    Basophils Absolute 0.04 0.00 - 0.20 k/uL    Absolute Immature Granulocyte 0.04 0.00 - 0.30 k/uL    RBC Morphology ANISOCYTOSIS PRESENT    Protime-INR    Collection Time: 06/03/22  1:23 PM   Result Value Ref Range    Protime 13.0 11.5 - 14.2 sec    INR 1.0    TYPE AND SCREEN    Collection Time: 06/03/22  1:23 PM   Result Value Ref Range    Expiration Date 06/30/2022,2359     Arm Band Number [id number]     ABO/Rh A POSITIVE     Antibody Screen NEGATIVE    EKG 12 Lead    Collection Time: 06/03/22  1:30 PM   Result Value Ref Range    Ventricular Rate 73 BPM    Atrial Rate 73 BPM    P-R Interval 204 ms    QRS Duration 102 ms    Q-T Interval 394 ms    QTc Calculation (Bazett) 434 ms    P Axis 75 degrees    R Axis 16 degrees    T Axis 65 degrees Recent Labs     06/03/22  1323   HGB 15.5*   HCT 50.4*   WBC 8.9   MCV 86.0      K 4.6      CO2 26   BUN 33*   CREATININE 1.18*   GLUCOSE 139*   INR 1.0   PROTIME 13.0   AST 26   ALT 23   LABALBU 4.3       No results for input(s): COVID19 in the last 720 hours. *Please note that labs listed above are the most recent lab values available in EPIC at the time of the visit and additional labs may have been drawn or resulted since that time. Imaging/Diagnostics:    SEE Epic   No results found. EKG: OF 6/3/2022 ON THE SHORT CHART . Diagnosis:      1. DX LEFT KNEE OA. Plans:     1.  LEFT KNEE TOTAL ARTHROPLASTY - [address], ROBY - CNP  6/3/2022  2:27 PM

## 2022-06-03 NOTE — H&P (VIEW-ONLY)
History and Physical Service   [facility]    HISTORY AND PHYSICAL EXAMINATION            Date of Evaluation: 6/3/2022  Patient name:  Clemencia Molina  MRN:   [id number]  YOB: 1950  PCP:    Jamila Javed MD    History Obtained From:     Patient, medical records    History of Present Illness: This is Clemencia Molina a 70 y.o. female who presents for a pre-admission testing appointment for an upcoming [address] [facility] by Sachin Mackey MD scheduled on 6/27/2022 @  0730 due to DX LEFT KNEE OA. The patient's chief complaint is 7-10 /10 the pain which has progressively worsened over the past 10 years  Progressively getting worse. THE pain is aggravated by ACTIVITY ESPECIALLY  STAIRS AND WALKING ANY DISTANCE AFTER ACTIVITY THE PAIN GETS WORSE LYING IN BED AT NIGHT. and is  NOT minimally relieved with ANYTHING. . Prior treatment includes INJECTIONS OF STEROIDS AND SYNVISC. WITH MINIMAL HELP. THE KNEE HAS GIVEN OUT . Lynette Wong Denies recent falls and injuries. SHE STATES \" IM VERY CAREFUL. \"    Sleep apnea questionnaire  1) Do you snore loudly? NO  2) Do you often feel tired, fatigued, or sleepy? YES  3) Has anyone observed you stop breathing or choking/gasping during your sleep? NO  4) Do you have hypertension? YES   5) BMI >35 kg/m2? YES 36.9. 6) Age > 48? YES   7) Pt is a male? NO    Functional Capacity:   1) Pt IS NOT  able to walk 2 city blocks on level ground without SOB. 2) Pt IS  able to climb 2 flights of stairs without SOB. 3) Pt IS NOT  able to walk up a hill for 1-2 city blocks without SOB. Past Medical History:     Past Medical History:   Diagnosis Date    Anxiety     Arthritis     Asthma     CHF (congestive heart failure) ([facility] [address]     x2 episodes, last episode 2010    Cold intolerance     Pt states she develops edema in her face, ears, hands, and feet with exposure to cold temperatures.     COPD (chronic obstructive pulmonary disease) ([facility] [address]     Diabetes mellitus ([facility] [address]     Disease of blood and blood forming organ     mediterranean Sickle cell (remission)    Heat intolerance     Pt states she develops edema in her face, ears, hands, and feet with exposure to heat. 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 5-6 years ago (Written 2020). No stents placed per pt.  CATARACT REMOVAL WITH IMPLANT Bilateral      SECTION      x2    COLONOSCOPY      FRACTURE SURGERY Right     ankle    GASTRIC BYPASS SURGERY      HERNIA REPAIR      abdominal x2    HYSTERECTOMY      OTHER SURGICAL HISTORY      benign skin lesions removed from face    ROTATOR CUFF REPAIR Left     SMALL INTESTINE SURGERY      gangrenous bowel    TONSILLECTOMY AND ADENOIDECTOMY      TOTAL KNEE ARTHROPLASTY Right 2020    RIGHT KNEE TOTAL ARTHROPLASTY - DEPUY STEMMED COMPONENTS performed by Sachin Mackey MD at [facility]        Medications Prior to Admission:     Prior to Admission medications    Medication Sig Start Date End Date Taking?  Authorizing Provider   levothyroxine (SYNTHROID) 25 MCG tablet Take 0.025 mcg by mouth Daily   Yes Historical Provider, MD   potassium chloride (MICRO-K) 10 MEQ extended release capsule Take 10 mEq by mouth daily    Historical Provider, MD   Ascorbic Acid (VITAMIN C) 1000 MG tablet Take 2,000 mg by mouth daily Takes (2) 1000mg tablets    Historical Provider, MD   metFORMIN (GLUCOPHAGE-XR) 500 MG extended release tablet Take 1,000 mg by mouth daily (with breakfast)     Historical Provider, MD   enalapril (VASOTEC) 20 MG tablet Take 20 mg by mouth daily    Historical Provider, MD   montelukast (SINGULAIR) 10 MG tablet Take 10 mg by mouth daily    Historical Provider, MD   sertraline (ZOLOFT) 25 MG tablet Take 12.5 mg by mouth daily Takes (1/2) of a 25 mg tablet    Historical Provider, MD   amLODIPine (NORVASC) 10 MG tablet Take 10 mg by mouth daily    Historical Provider, MD   furosemide (LASIX) 20 MG tablet Take 20 mg by mouth daily    Historical Provider, MD   cloNIDine (CATAPRES) 0.2 MG tablet Take 0.2 mg by mouth 2 times daily    Historical Provider, MD        Allergies:     Red dye, Adhesive tape, Codeine, Keflex [cephalexin], Kiwi extract, Other, Oxycodone, and Tetracyclines & related    Social History:     Tobacco:    reports that she quit smoking about 10 years ago. She has never used smokeless tobacco.  Alcohol:      reports previous alcohol use. Drug Use:  reports no history of drug use. Family History:     Family History   Problem Relation Age of Onset    Heart Disease Father     Heart Attack Paternal Grandmother        Review of Systems:     Positive and Negative as described in HPI. CONSTITUTIONAL:  Negative for fevers, chills, sweats, fatigue, and weight loss. HEENT: WEARS  glasses, hearing changes, rhinorrhea, and throat pain. RESPIRATORY: HAS ASTHMA WITH OCCASIONAL  shortness of breath, cough, congestion, and wheezing. CARDIOVASCULAR: NO  chest pain, blood clot, irregular heartbeat, and palpitations. HAS HYPERTENSION AND HAS HAD 2 EPISODES OF CONGESTIVE HEART FAILURE. GASTROINTESTINAL:  Negative for reflux, nausea, vomiting, diarrhea, constipation, change in bowel habits, and abdominal pain. GENITOURINARY:  Negative for difficulty of urination, burning with urination, and frequency. INTEGUMENT:  Negative for rash, skin lesions, and easy bruising. Instructed pt to call   as soon as possible if a rash or wound develops prior to surgery. Pt voiced understanding. HEMATOLOGIC/LYMPHATIC:  Negative for swelling/edema. ALLERGIC/IMMUNOLOGIC:  Negative for urticaria and itching.   ENDOCRINE: HAS DIABETES,  MONITORS BLOOD SUGARS AVERAGES 120-140 IN THE AM HAS HYPOTHYROIDISM. Julio Mendoza MUSCULOSKELETAL: See HPI. NEUROLOGICAL:  Negative for headaches, dizziness, lightheadedness, numbness, and tingling extremities. BEHAVIOR/PSYCH: HAS BEEN TREATED FOR ANXIETY depression and anxiety. Physical Exam:   BP (!) 134/96   Pulse 91   Temp 97.2 °F (36.2 °C) (Temporal)   Resp 20   Ht 5' 5\" (1.651 m)   Wt 222 lb (100.7 kg)   SpO2 96%   BMI 36.94 kg/m²   No LMP recorded. Patient has had a hysterectomy. No obstetric history on file. No results for input(s): POCGLU in the last 72 hours. General Appearance:  Alert, well appearing, and in no acute distress. Mental status:  Oriented to person, place, and time. Head:  Normocephalic and atraumatic. Eye:  No icterus, redness, pupils equal and reactive, extraocular eye movements intact, and conjunctiva clear. Ear:  Hearing grossly intact. Nose:  No drainage noted. Mouth:  Mucous membranes moist.  Neck:  Supple and no carotid bruits noted. Lungs:  Bilateral equal air entry, clear to auscultation, no wheezing, rales or rhonchi, and normal effort. Cardiovascular:  Normal rate, regular rhythm, no murmur, gallop, or rub. Abdomen:  Soft, non-tender, non-distended, and active bowel sounds. Neurologic:  Normal speech and cranial nerves II through XII grossly intact. Strength 5/5 bilaterally. Skin:  No gross lesions, rashes, bruising, or bleeding on exposed skin area. Extremities:  Posterior tibial pulses 2+ bilaterally. No pedal edema. No calf tenderness with palpation. Psych:  Normal affect.      Investigations:      Laboratory Testing:  Recent Results (from the past 24 hour(s))   Comprehensive Metabolic Panel    Collection Time: 06/03/22  1:23 PM   Result Value Ref Range    Glucose 139 (H) 70 - 99 mg/dL    BUN 33 (H) 8 - 23 mg/dL    CREATININE 1.18 (H) 0.50 - 0.90 mg/dL    Bun/Cre Ratio 28 (H) 9 - 20    Calcium 9.7 8.6 - 10.4 mg/dL    Sodium 139 135 - 144 mmol/L    Potassium 4.6 3.7 - 5.3 mmol/L    Chloride 101 98 - 107 mmol/L    CO2 26 20 - 31 mmol/L    Anion Gap 12 9 - 17 mmol/L    Alkaline Phosphatase 60 35 - 104 U/L    ALT 23 5 - 33 U/L    AST 26 <32 U/L    Total Bilirubin 0.20 (L) 0.3 - 1.2 mg/dL    Total Protein 7.8 6.4 - 8.3 g/dL    Albumin 4.3 3.5 - 5.2 g/dL    GFR Non-African American 45 (L) >60 mL/min    GFR  55 (L) >60 mL/min    GFR Comment         CBC with Auto Differential    Collection Time: 06/03/22  1:23 PM   Result Value Ref Range    WBC 8.9 3.5 - 11.3 k/uL    RBC 5.86 (H) 3.95 - 5.11 m/uL    Hemoglobin 15.5 (H) 11.9 - 15.1 g/dL    Hematocrit 50.4 (H) 36.3 - 47.1 %    MCV 86.0 82.6 - 102.9 fL    MCH 26.5 25.2 - 33.5 pg    MCHC 30.8 28.4 - 34.8 g/dL    RDW 15.7 (H) 11.8 - 14.4 %    Platelets 502 917 - 496 k/uL    MPV 10.8 8.1 - 13.5 fL    NRBC Automated 0.0 0.0 per 100 WBC    Seg Neutrophils 46 36 - 65 %    Lymphocytes 41 24 - 43 %    Monocytes 9 3 - 12 %    Eosinophils % 2 1 - 4 %    Basophils 1 0 - 2 %    Immature Granulocytes 1 (H) 0 %    Segs Absolute 4.21 1.50 - 8.10 k/uL    Absolute Lymph # 3.62 1.10 - 3.70 k/uL    Absolute Mono # 0.76 0.10 - 1.20 k/uL    Absolute Eos # 0.20 0.00 - 0.44 k/uL    Basophils Absolute 0.04 0.00 - 0.20 k/uL    Absolute Immature Granulocyte 0.04 0.00 - 0.30 k/uL    RBC Morphology ANISOCYTOSIS PRESENT    Protime-INR    Collection Time: 06/03/22  1:23 PM   Result Value Ref Range    Protime 13.0 11.5 - 14.2 sec    INR 1.0    TYPE AND SCREEN    Collection Time: 06/03/22  1:23 PM   Result Value Ref Range    Expiration Date 06/30/2022,2359     Arm Band Number [id number]     ABO/Rh A POSITIVE     Antibody Screen NEGATIVE    EKG 12 Lead    Collection Time: 06/03/22  1:30 PM   Result Value Ref Range    Ventricular Rate 73 BPM    Atrial Rate 73 BPM    P-R Interval 204 ms    QRS Duration 102 ms    Q-T Interval 394 ms    QTc Calculation (Bazett) 434 ms    P Axis 75 degrees    R Axis 16 degrees    T Axis 65 degrees

## 2022-06-03 NOTE — PRE-PROCEDURE INSTRUCTIONS
ARRIVE AT [facility] ON Monday June 27th   Arrive at 5:30  Any questions call [phone]    Once you enter the hospital lobby, take the elevators to the second floor. Check-In is at the surgery registration desk. Continue to take your home medications as you normally do up to and including the night before surgery with the exception of any blood thinning medications. Please stop any blood thinning medications as directed by your surgeon or prescribing physician. Failure to stop certain medications may interfere with your scheduled surgery. These may include:  Aspirin, Warfarin (Coumadin), Clopidogrel (Plavix), Ibuprofen (Motrin, Advil), Naproxen (Aleve), Meloxicam (Mobic), Celecoxib (Celebrex), Eliquis, Pradaxa, Xarelto, Effient, Fish Oil, Herbal supplements. aspirin    If you are diabetic, do not take any of your diabetic medications by mouth the morning of surgery. If you are taking insulin contact the doctor that manages your diabetes for instructions about any changes to your insulin dosages the day before surgery. Do not inject insulin or other injectable diabetic medications the morning of surgery unless otherwise instructed by the doctor who manages your diabetes. Please take the following medication(s) the day of surgery with a small sip of water:Levothyroxine, Zoloft , Amlodipine(Norvasc)    PREPARING FOR YOUR SURGERY:     Before surgery, you can play an important role in your own health. Because skin is not sterile, we need to be sure that your skin is as free of germs as possible before surgery by carefully washing before surgery. Preparing or prepping skin before surgery can reduce the risk of a surgical site infection.   Do not shave the area of your body where your surgery will be performed unless you received specific permission from your physician.     You will need to shower at home the night before surgery and the morning of surgery with a special soap called chlorhexidine gluconate (CHG*). *Not to be used by people allergic to Chlorhexidine Gluconate (CHG). Following these instructions will help you be sure that your skin is clean before surgery. Instructions on cleaning your skin before surgery: The night before your surgery:      You will need to shower with warm water (not hot) and the CHG soap.  Use a clean wash cloth and a clean towel. Have clean clothes available to put on after the shower.    First wash your hair with regular shampoo. Rinse your hair and body thoroughly to remove the shampoo. [place] Wash your face with your regular soap or water only. Thoroughly rinse your body with warm water from the neck down.  Turn water off to prevent rinsing the soap off too soon.  With a clean wet washcloth and half of the CHG soap in the bottle, lather your entire body from the neck down. Do not use CHG soap near your eyes or ears to avoid injury to those areas.  Wash thoroughly, paying special attention to the area where your surgery will be performed.  Wash your body gently for five (5) minutes. Avoid scrubbing your skin too hard.  Turn the water back on and rinse your body thoroughly.  Pat yourself dry with a clean, soft towel. Do not apply lotion, cream or powder.  Dress with clean freshly washed clothes. The morning of surgery:     Repeat shower following steps above - using remaining half of CHG soap in bottle. Patient Instructions:    [place] If you are having any type of anesthesia you are to have nothing to eat or drink after midnight the night before your surgery. This includes gum, mints, water or smoking or chewing tobacco.  The only exception to this is a small sip of water to take with any morning dose of heart, blood pressure, or seizure medications. No alcoholic beverages for 24 hours prior to surgery.      Bring a list of all medications you take, along with the dose of the medications and how often you take it. If more convenient bring the pharmacy bottles in a zip lock bag.  Brush your teeth but do not swallow water. · Do not wear any jewelry or body piercings day of surgery. Also, NO lotion, perfume or deodorant to be used the day of surgery. No nail polish on the operative extremity (arm/leg surgeries)    · Do not bring any valuables such as jewelry, cash, or credit cards. If you are staying overnight with us, please bring a small bag of personal items.  Please wear loose, comfortable clothing. If you are potentially going to have a cast or brace bring clothing that will fit over them.  In case of illness - If you have cold or flu like symptoms (high fever, runny nose, sore throat, cough, etc.) rash, nausea, vomiting, loose stools, and/or recent contact with someone who has a contagious disease (chicken pox, measles, etc.) Please call your doctor before coming to the hospital.     Day of Surgery/Procedure:    As a patient at [facility] you can expect quality medical and nursing care that is centered on your individual needs. Our goal is to make your surgical experience as comfortable as possible    . Transportation After Your Surgery/Procedure: You will need a friend or family member to drive you home after your procedure. Your  must be 25years of age or older and able to sign off on your discharge instructions. A taxi cab or any other form of public transportation is not acceptable. Your friend or family member must stay at the hospital throughout your procedure. Someone must remain with you for the first 24 hours after your surgery if you receive anesthesia or medication. If you do not have someone to stay with you, your procedure may be cancelled.       If you have any other questions regarding your procedure or the day of surgery, please

## 2022-06-04 LAB
CULTURE: ABNORMAL
MRSA, DNA, NASAL: NEGATIVE
SPECIMEN DESCRIPTION: ABNORMAL
SPECIMEN DESCRIPTION: NORMAL

## 2022-06-06 LAB
EKG ATRIAL RATE: 73 BPM
EKG P AXIS: 75 DEGREES
EKG P-R INTERVAL: 204 MS
EKG Q-T INTERVAL: 394 MS
EKG QRS DURATION: 102 MS
EKG QTC CALCULATION (BAZETT): 434 MS
EKG R AXIS: 16 DEGREES
EKG T AXIS: 65 DEGREES
EKG VENTRICULAR RATE: 73 BPM

## 2022-06-06 PROCEDURE — 93010 ELECTROCARDIOGRAM REPORT: CPT | Performed by: INTERNAL MEDICINE

## 2022-06-27 ENCOUNTER — ANESTHESIA (OUTPATIENT)
Dept: OPERATING ROOM | Age: 72
End: 2022-06-27
Payer: MEDICARE

## 2022-06-27 ENCOUNTER — ANESTHESIA EVENT (OUTPATIENT)
Dept: OPERATING ROOM | Age: 72
End: 2022-06-27
Payer: MEDICARE

## 2022-06-27 ENCOUNTER — HOSPITAL ENCOUNTER (OUTPATIENT)
Age: 72
Discharge: HOME | End: 2022-06-28
Attending: ORTHOPAEDIC SURGERY | Admitting: ORTHOPAEDIC SURGERY
Payer: MEDICARE

## 2022-06-27 ENCOUNTER — APPOINTMENT (OUTPATIENT)
Dept: GENERAL RADIOLOGY | Age: 72
End: 2022-06-27
Attending: ORTHOPAEDIC SURGERY
Payer: MEDICARE

## 2022-06-27 PROBLEM — Z96.652 TOTAL KNEE REPLACEMENT STATUS, LEFT: Status: ACTIVE | Noted: 2022-06-27

## 2022-06-27 PROBLEM — M17.12 PRIMARY OSTEOARTHRITIS OF LEFT KNEE: Chronic | Status: ACTIVE | Noted: 2022-06-27

## 2022-06-27 PROBLEM — M17.12 PRIMARY OSTEOARTHRITIS OF LEFT KNEE: Chronic | Status: RESOLVED | Noted: 2022-06-27 | Resolved: 2022-06-27

## 2022-06-27 LAB
GLUCOSE BLD-MCNC: 124 MG/DL (ref 65–105)
GLUCOSE BLD-MCNC: 141 MG/DL (ref 65–105)
GLUCOSE BLD-MCNC: 204 MG/DL (ref 65–105)
GLUCOSE BLD-MCNC: 247 MG/DL (ref 65–105)

## 2022-06-27 PROCEDURE — 73560 X-RAY EXAM OF KNEE 1 OR 2: CPT

## 2022-06-27 PROCEDURE — 6360000002 HC RX W HCPCS: Performed by: ANESTHESIOLOGY

## 2022-06-27 PROCEDURE — 2709999900 HC NON-CHARGEABLE SUPPLY: Performed by: ORTHOPAEDIC SURGERY

## 2022-06-27 PROCEDURE — 3700000001 HC ADD 15 MINUTES (ANESTHESIA): Performed by: ORTHOPAEDIC SURGERY

## 2022-06-27 PROCEDURE — 6370000000 HC RX 637 (ALT 250 FOR IP): Performed by: ANESTHESIOLOGY

## 2022-06-27 PROCEDURE — 2580000003 HC RX 258: Performed by: ORTHOPAEDIC SURGERY

## 2022-06-27 PROCEDURE — 97162 PT EVAL MOD COMPLEX 30 MIN: CPT

## 2022-06-27 PROCEDURE — 97535 SELF CARE MNGMENT TRAINING: CPT

## 2022-06-27 PROCEDURE — 2500000003 HC RX 250 WO HCPCS: Performed by: ORTHOPAEDIC SURGERY

## 2022-06-27 PROCEDURE — 3600000015 HC SURGERY LEVEL 5 ADDTL 15MIN: Performed by: ORTHOPAEDIC SURGERY

## 2022-06-27 PROCEDURE — 2580000003 HC RX 258: Performed by: ANESTHESIOLOGY

## 2022-06-27 PROCEDURE — 82947 ASSAY GLUCOSE BLOOD QUANT: CPT

## 2022-06-27 PROCEDURE — 2500000003 HC RX 250 WO HCPCS: Performed by: NURSE ANESTHETIST, CERTIFIED REGISTERED

## 2022-06-27 PROCEDURE — 97530 THERAPEUTIC ACTIVITIES: CPT

## 2022-06-27 PROCEDURE — C1776 JOINT DEVICE (IMPLANTABLE): HCPCS | Performed by: ORTHOPAEDIC SURGERY

## 2022-06-27 PROCEDURE — 6360000002 HC RX W HCPCS: Performed by: NURSE ANESTHETIST, CERTIFIED REGISTERED

## 2022-06-27 PROCEDURE — A4217 STERILE WATER/SALINE, 500 ML: HCPCS | Performed by: ORTHOPAEDIC SURGERY

## 2022-06-27 PROCEDURE — 64447 NJX AA&/STRD FEMORAL NRV IMG: CPT | Performed by: ANESTHESIOLOGY

## 2022-06-27 PROCEDURE — C9290 INJ, BUPIVACAINE LIPOSOME: HCPCS | Performed by: ANESTHESIOLOGY

## 2022-06-27 PROCEDURE — 2720000010 HC SURG SUPPLY STERILE: Performed by: ORTHOPAEDIC SURGERY

## 2022-06-27 PROCEDURE — 7100000000 HC PACU RECOVERY - FIRST 15 MIN: Performed by: ORTHOPAEDIC SURGERY

## 2022-06-27 PROCEDURE — C1713 ANCHOR/SCREW BN/BN,TIS/BN: HCPCS | Performed by: ORTHOPAEDIC SURGERY

## 2022-06-27 PROCEDURE — 7100000001 HC PACU RECOVERY - ADDTL 15 MIN: Performed by: ORTHOPAEDIC SURGERY

## 2022-06-27 PROCEDURE — 6360000002 HC RX W HCPCS: Performed by: ORTHOPAEDIC SURGERY

## 2022-06-27 PROCEDURE — 6370000000 HC RX 637 (ALT 250 FOR IP): Performed by: ORTHOPAEDIC SURGERY

## 2022-06-27 PROCEDURE — 97166 OT EVAL MOD COMPLEX 45 MIN: CPT

## 2022-06-27 PROCEDURE — 97110 THERAPEUTIC EXERCISES: CPT

## 2022-06-27 PROCEDURE — 3700000000 HC ANESTHESIA ATTENDED CARE: Performed by: ORTHOPAEDIC SURGERY

## 2022-06-27 PROCEDURE — 3600000005 HC SURGERY LEVEL 5 BASE: Performed by: ORTHOPAEDIC SURGERY

## 2022-06-27 PROCEDURE — 2500000003 HC RX 250 WO HCPCS: Performed by: ANESTHESIOLOGY

## 2022-06-27 DEVICE — CEMENT BNE 40GM FULL DOSE PMMA W/O ANTIBIO HI VISC N RADPQ: Type: IMPLANTABLE DEVICE | Site: KNEE | Status: FUNCTIONAL

## 2022-06-27 DEVICE — BASEPLATE TIB SZ 5 FIX BEAR CO CHROM MOLYBDENUM TI ALLY END: Type: IMPLANTABLE DEVICE | Site: KNEE | Status: FUNCTIONAL

## 2022-06-27 DEVICE — STEM FEM L50MM DIA14MM KNEE CEM REV ATTUNE: Type: IMPLANTABLE DEVICE | Site: KNEE | Status: FUNCTIONAL

## 2022-06-27 DEVICE — COMPONENT PAT DIA35MM KNEE POLY CEM MEDIALIZED ANAT ATTUNE: Type: IMPLANTABLE DEVICE | Site: PATELLA | Status: FUNCTIONAL

## 2022-06-27 DEVICE — IMPLANTABLE DEVICE: Type: IMPLANTABLE DEVICE | Site: KNEE | Status: FUNCTIONAL

## 2022-06-27 RX ORDER — ONDANSETRON 4 MG/1
4 TABLET, ORALLY DISINTEGRATING ORAL EVERY 8 HOURS PRN
Status: DISCONTINUED | OUTPATIENT
Start: 2022-06-27 | End: 2022-06-28 | Stop reason: HOSPADM

## 2022-06-27 RX ORDER — MIDAZOLAM HYDROCHLORIDE 1 MG/ML
2 INJECTION INTRAMUSCULAR; INTRAVENOUS ONCE
Status: COMPLETED | OUTPATIENT
Start: 2022-06-27 | End: 2022-06-27

## 2022-06-27 RX ORDER — SODIUM CHLORIDE 9 MG/ML
INJECTION, SOLUTION INTRAVENOUS PRN
Status: DISCONTINUED | OUTPATIENT
Start: 2022-06-27 | End: 2022-06-27 | Stop reason: HOSPADM

## 2022-06-27 RX ORDER — INSULIN LISPRO 100 [IU]/ML
0-12 INJECTION, SOLUTION INTRAVENOUS; SUBCUTANEOUS
Status: DISCONTINUED | OUTPATIENT
Start: 2022-06-27 | End: 2022-06-28 | Stop reason: HOSPADM

## 2022-06-27 RX ORDER — SODIUM CHLORIDE 0.9 % (FLUSH) 0.9 %
5-40 SYRINGE (ML) INJECTION EVERY 12 HOURS SCHEDULED
Status: DISCONTINUED | OUTPATIENT
Start: 2022-06-27 | End: 2022-06-27 | Stop reason: HOSPADM

## 2022-06-27 RX ORDER — SODIUM CHLORIDE 0.9 % (FLUSH) 0.9 %
5-40 SYRINGE (ML) INJECTION PRN
Status: DISCONTINUED | OUTPATIENT
Start: 2022-06-27 | End: 2022-06-28 | Stop reason: HOSPADM

## 2022-06-27 RX ORDER — TRANEXAMIC ACID 100 MG/ML
INJECTION, SOLUTION INTRAVENOUS PRN
Status: DISCONTINUED | OUTPATIENT
Start: 2022-06-27 | End: 2022-06-27 | Stop reason: SDUPTHER

## 2022-06-27 RX ORDER — METOPROLOL TARTRATE 5 MG/5ML
INJECTION INTRAVENOUS PRN
Status: DISCONTINUED | OUTPATIENT
Start: 2022-06-27 | End: 2022-06-27 | Stop reason: SDUPTHER

## 2022-06-27 RX ORDER — INSULIN LISPRO 100 [IU]/ML
0-6 INJECTION, SOLUTION INTRAVENOUS; SUBCUTANEOUS NIGHTLY
Status: DISCONTINUED | OUTPATIENT
Start: 2022-06-27 | End: 2022-06-28 | Stop reason: HOSPADM

## 2022-06-27 RX ORDER — SODIUM CHLORIDE, SODIUM LACTATE, POTASSIUM CHLORIDE, CALCIUM CHLORIDE 600; 310; 30; 20 MG/100ML; MG/100ML; MG/100ML; MG/100ML
INJECTION, SOLUTION INTRAVENOUS CONTINUOUS
Status: DISCONTINUED | OUTPATIENT
Start: 2022-06-27 | End: 2022-06-28 | Stop reason: HOSPADM

## 2022-06-27 RX ORDER — TRANEXAMIC ACID 100 MG/ML
INJECTION, SOLUTION INTRAVENOUS
Status: COMPLETED
Start: 2022-06-27 | End: 2022-06-27

## 2022-06-27 RX ORDER — ENALAPRIL MALEATE 20 MG/1
20 TABLET ORAL DAILY
Status: DISCONTINUED | OUTPATIENT
Start: 2022-06-27 | End: 2022-06-28 | Stop reason: HOSPADM

## 2022-06-27 RX ORDER — SODIUM CHLORIDE 0.9 % (FLUSH) 0.9 %
5-40 SYRINGE (ML) INJECTION EVERY 12 HOURS SCHEDULED
Status: DISCONTINUED | OUTPATIENT
Start: 2022-06-27 | End: 2022-06-28 | Stop reason: HOSPADM

## 2022-06-27 RX ORDER — POLYETHYLENE GLYCOL 3350 17 G/17G
17 POWDER, FOR SOLUTION ORAL DAILY
Status: DISCONTINUED | OUTPATIENT
Start: 2022-06-27 | End: 2022-06-28 | Stop reason: HOSPADM

## 2022-06-27 RX ORDER — HYDROCODONE BITARTRATE AND ACETAMINOPHEN 5; 325 MG/1; MG/1
1 TABLET ORAL EVERY 4 HOURS PRN
Status: ACTIVE | OUTPATIENT
Start: 2022-06-27 | End: 2022-06-28

## 2022-06-27 RX ORDER — CLONIDINE HYDROCHLORIDE 0.2 MG/1
0.2 TABLET ORAL 2 TIMES DAILY
Status: DISCONTINUED | OUTPATIENT
Start: 2022-06-27 | End: 2022-06-28 | Stop reason: HOSPADM

## 2022-06-27 RX ORDER — ONDANSETRON 2 MG/ML
INJECTION INTRAMUSCULAR; INTRAVENOUS PRN
Status: DISCONTINUED | OUTPATIENT
Start: 2022-06-27 | End: 2022-06-27 | Stop reason: SDUPTHER

## 2022-06-27 RX ORDER — HYDROCODONE BITARTRATE AND ACETAMINOPHEN 5; 325 MG/1; MG/1
2 TABLET ORAL EVERY 6 HOURS PRN
Status: DISPENSED | OUTPATIENT
Start: 2022-06-27 | End: 2022-06-28

## 2022-06-27 RX ORDER — ONDANSETRON 2 MG/ML
4 INJECTION INTRAMUSCULAR; INTRAVENOUS EVERY 6 HOURS PRN
Status: DISCONTINUED | OUTPATIENT
Start: 2022-06-27 | End: 2022-06-28 | Stop reason: HOSPADM

## 2022-06-27 RX ORDER — MONTELUKAST SODIUM 10 MG/1
10 TABLET ORAL DAILY
Status: DISCONTINUED | OUTPATIENT
Start: 2022-06-27 | End: 2022-06-28 | Stop reason: HOSPADM

## 2022-06-27 RX ORDER — DEXAMETHASONE SODIUM PHOSPHATE 10 MG/ML
INJECTION, SOLUTION INTRAMUSCULAR; INTRAVENOUS PRN
Status: DISCONTINUED | OUTPATIENT
Start: 2022-06-27 | End: 2022-06-27 | Stop reason: SDUPTHER

## 2022-06-27 RX ORDER — LEVOTHYROXINE SODIUM 0.03 MG/1
25 TABLET ORAL DAILY
Status: DISCONTINUED | OUTPATIENT
Start: 2022-06-28 | End: 2022-06-28 | Stop reason: HOSPADM

## 2022-06-27 RX ORDER — MELOXICAM 7.5 MG/1
3.75 TABLET ORAL DAILY
Status: DISCONTINUED | OUTPATIENT
Start: 2022-06-27 | End: 2022-06-28 | Stop reason: HOSPADM

## 2022-06-27 RX ORDER — LIDOCAINE HYDROCHLORIDE 20 MG/ML
INJECTION, SOLUTION EPIDURAL; INFILTRATION; INTRACAUDAL; PERINEURAL PRN
Status: DISCONTINUED | OUTPATIENT
Start: 2022-06-27 | End: 2022-06-27 | Stop reason: SDUPTHER

## 2022-06-27 RX ORDER — ROCURONIUM BROMIDE 10 MG/ML
INJECTION, SOLUTION INTRAVENOUS PRN
Status: DISCONTINUED | OUTPATIENT
Start: 2022-06-27 | End: 2022-06-27 | Stop reason: SDUPTHER

## 2022-06-27 RX ORDER — SODIUM CHLORIDE 0.9 % (FLUSH) 0.9 %
5-40 SYRINGE (ML) INJECTION PRN
Status: DISCONTINUED | OUTPATIENT
Start: 2022-06-27 | End: 2022-06-27 | Stop reason: HOSPADM

## 2022-06-27 RX ORDER — FENTANYL CITRATE 50 UG/ML
INJECTION, SOLUTION INTRAMUSCULAR; INTRAVENOUS PRN
Status: DISCONTINUED | OUTPATIENT
Start: 2022-06-27 | End: 2022-06-27 | Stop reason: SDUPTHER

## 2022-06-27 RX ORDER — POTASSIUM CHLORIDE 750 MG/1
10 TABLET, FILM COATED, EXTENDED RELEASE ORAL DAILY
Status: DISCONTINUED | OUTPATIENT
Start: 2022-06-27 | End: 2022-06-28 | Stop reason: HOSPADM

## 2022-06-27 RX ORDER — DEXTROSE MONOHYDRATE 50 MG/ML
100 INJECTION, SOLUTION INTRAVENOUS PRN
Status: DISCONTINUED | OUTPATIENT
Start: 2022-06-27 | End: 2022-06-28 | Stop reason: HOSPADM

## 2022-06-27 RX ORDER — MORPHINE SULFATE 2 MG/ML
2 INJECTION, SOLUTION INTRAMUSCULAR; INTRAVENOUS
Status: DISCONTINUED | OUTPATIENT
Start: 2022-06-27 | End: 2022-06-28 | Stop reason: HOSPADM

## 2022-06-27 RX ORDER — BUPIVACAINE HYDROCHLORIDE 2.5 MG/ML
INJECTION, SOLUTION EPIDURAL; INFILTRATION; INTRACAUDAL PRN
Status: DISCONTINUED | OUTPATIENT
Start: 2022-06-27 | End: 2022-06-27 | Stop reason: SDUPTHER

## 2022-06-27 RX ORDER — PHENYLEPHRINE HCL IN 0.9% NACL 1 MG/10 ML
SYRINGE (ML) INTRAVENOUS PRN
Status: DISCONTINUED | OUTPATIENT
Start: 2022-06-27 | End: 2022-06-27 | Stop reason: SDUPTHER

## 2022-06-27 RX ORDER — HYDROMORPHONE HYDROCHLORIDE 1 MG/ML
0.5 INJECTION, SOLUTION INTRAMUSCULAR; INTRAVENOUS; SUBCUTANEOUS EVERY 5 MIN PRN
Status: DISCONTINUED | OUTPATIENT
Start: 2022-06-27 | End: 2022-06-27 | Stop reason: HOSPADM

## 2022-06-27 RX ORDER — KETAMINE HCL IN NACL, ISO-OSM 100MG/10ML
SYRINGE (ML) INJECTION PRN
Status: DISCONTINUED | OUTPATIENT
Start: 2022-06-27 | End: 2022-06-27 | Stop reason: SDUPTHER

## 2022-06-27 RX ORDER — SERTRALINE HYDROCHLORIDE 25 MG/1
12.5 TABLET, FILM COATED ORAL DAILY
Status: DISCONTINUED | OUTPATIENT
Start: 2022-06-27 | End: 2022-06-28 | Stop reason: HOSPADM

## 2022-06-27 RX ORDER — CLINDAMYCIN PHOSPHATE 900 MG/50ML
900 INJECTION INTRAVENOUS ONCE
Status: COMPLETED | OUTPATIENT
Start: 2022-06-27 | End: 2022-06-27

## 2022-06-27 RX ORDER — METFORMIN HYDROCHLORIDE 500 MG/1
1000 TABLET, EXTENDED RELEASE ORAL
Status: DISCONTINUED | OUTPATIENT
Start: 2022-06-28 | End: 2022-06-28 | Stop reason: HOSPADM

## 2022-06-27 RX ORDER — PROPOFOL 10 MG/ML
INJECTION, EMULSION INTRAVENOUS PRN
Status: DISCONTINUED | OUTPATIENT
Start: 2022-06-27 | End: 2022-06-27 | Stop reason: SDUPTHER

## 2022-06-27 RX ORDER — CLINDAMYCIN PHOSPHATE 900 MG/50ML
900 INJECTION INTRAVENOUS EVERY 8 HOURS
Status: COMPLETED | OUTPATIENT
Start: 2022-06-27 | End: 2022-06-28

## 2022-06-27 RX ORDER — SODIUM CHLORIDE, SODIUM LACTATE, POTASSIUM CHLORIDE, CALCIUM CHLORIDE 600; 310; 30; 20 MG/100ML; MG/100ML; MG/100ML; MG/100ML
INJECTION, SOLUTION INTRAVENOUS CONTINUOUS
Status: DISCONTINUED | OUTPATIENT
Start: 2022-06-27 | End: 2022-06-27 | Stop reason: SDUPTHER

## 2022-06-27 RX ORDER — SODIUM CHLORIDE 9 MG/ML
INJECTION, SOLUTION INTRAVENOUS PRN
Status: DISCONTINUED | OUTPATIENT
Start: 2022-06-27 | End: 2022-06-28 | Stop reason: HOSPADM

## 2022-06-27 RX ORDER — MORPHINE SULFATE 4 MG/ML
4 INJECTION, SOLUTION INTRAMUSCULAR; INTRAVENOUS
Status: DISCONTINUED | OUTPATIENT
Start: 2022-06-27 | End: 2022-06-28 | Stop reason: HOSPADM

## 2022-06-27 RX ORDER — ONDANSETRON 2 MG/ML
4 INJECTION INTRAMUSCULAR; INTRAVENOUS
Status: DISCONTINUED | OUTPATIENT
Start: 2022-06-27 | End: 2022-06-27 | Stop reason: HOSPADM

## 2022-06-27 RX ORDER — LIDOCAINE HYDROCHLORIDE 10 MG/ML
INJECTION, SOLUTION EPIDURAL; INFILTRATION; INTRACAUDAL; PERINEURAL PRN
Status: DISCONTINUED | OUTPATIENT
Start: 2022-06-27 | End: 2022-06-27 | Stop reason: SDUPTHER

## 2022-06-27 RX ORDER — FENTANYL CITRATE 50 UG/ML
25 INJECTION, SOLUTION INTRAMUSCULAR; INTRAVENOUS EVERY 5 MIN PRN
Status: DISCONTINUED | OUTPATIENT
Start: 2022-06-27 | End: 2022-06-27 | Stop reason: HOSPADM

## 2022-06-27 RX ORDER — IPRATROPIUM BROMIDE AND ALBUTEROL SULFATE 2.5; .5 MG/3ML; MG/3ML
1 SOLUTION RESPIRATORY (INHALATION) ONCE
Status: COMPLETED | OUTPATIENT
Start: 2022-06-27 | End: 2022-06-27

## 2022-06-27 RX ORDER — AMLODIPINE BESYLATE 10 MG/1
10 TABLET ORAL DAILY
Status: DISCONTINUED | OUTPATIENT
Start: 2022-06-27 | End: 2022-06-28 | Stop reason: HOSPADM

## 2022-06-27 RX ADMIN — BUPIVACAINE 10 ML: 13.3 INJECTION, SUSPENSION, LIPOSOMAL INFILTRATION at 07:06

## 2022-06-27 RX ADMIN — Medication 50 MCG: at 10:25

## 2022-06-27 RX ADMIN — TRANEXAMIC ACID 1000 MG: 100 INJECTION, SOLUTION INTRAVENOUS at 09:40

## 2022-06-27 RX ADMIN — METOPROLOL TARTRATE 1 MG: 1 INJECTION, SOLUTION INTRAVENOUS at 10:11

## 2022-06-27 RX ADMIN — LIDOCAINE HYDROCHLORIDE 100 MG: 20 INJECTION, SOLUTION EPIDURAL; INFILTRATION; INTRACAUDAL; PERINEURAL at 07:41

## 2022-06-27 RX ADMIN — CLINDAMYCIN PHOSPHATE 900 MG: 900 INJECTION, SOLUTION INTRAVENOUS at 23:51

## 2022-06-27 RX ADMIN — CLONIDINE HYDROCHLORIDE 0.2 MG: 0.2 TABLET ORAL at 21:25

## 2022-06-27 RX ADMIN — METOPROLOL TARTRATE 2 MG: 1 INJECTION, SOLUTION INTRAVENOUS at 10:22

## 2022-06-27 RX ADMIN — Medication 50 MCG: at 08:05

## 2022-06-27 RX ADMIN — TRANEXAMIC ACID 1000 MG: 100 INJECTION, SOLUTION INTRAVENOUS at 07:57

## 2022-06-27 RX ADMIN — SODIUM CHLORIDE, POTASSIUM CHLORIDE, SODIUM LACTATE AND CALCIUM CHLORIDE: 600; 310; 30; 20 INJECTION, SOLUTION INTRAVENOUS at 06:51

## 2022-06-27 RX ADMIN — MELOXICAM 3.75 MG: 7.5 TABLET ORAL at 14:16

## 2022-06-27 RX ADMIN — Medication 25 MCG: at 10:03

## 2022-06-27 RX ADMIN — ASPIRIN 325 MG: 325 TABLET, COATED ORAL at 21:25

## 2022-06-27 RX ADMIN — Medication 10 MG: at 07:41

## 2022-06-27 RX ADMIN — IPRATROPIUM BROMIDE AND ALBUTEROL SULFATE 1 AMPULE: 2.5; .5 SOLUTION RESPIRATORY (INHALATION) at 13:30

## 2022-06-27 RX ADMIN — HYDROCODONE BITARTRATE AND ACETAMINOPHEN 2 TABLET: 5; 325 TABLET ORAL at 19:47

## 2022-06-27 RX ADMIN — POLYETHYLENE GLYCOL 3350 17 G: 17 POWDER, FOR SOLUTION ORAL at 14:16

## 2022-06-27 RX ADMIN — Medication 5 MG: at 10:01

## 2022-06-27 RX ADMIN — BUPIVACAINE HYDROCHLORIDE 20 ML: 2.5 INJECTION, SOLUTION EPIDURAL; INFILTRATION; INTRACAUDAL; PERINEURAL at 07:06

## 2022-06-27 RX ADMIN — INSULIN HUMAN 10 UNITS: 100 INJECTION, SOLUTION PARENTERAL at 10:57

## 2022-06-27 RX ADMIN — ASPIRIN 325 MG: 325 TABLET, COATED ORAL at 14:16

## 2022-06-27 RX ADMIN — CLINDAMYCIN PHOSPHATE 900 MG: 900 INJECTION, SOLUTION INTRAVENOUS at 07:48

## 2022-06-27 RX ADMIN — PROPOFOL 130 MG: 10 INJECTION, EMULSION INTRAVENOUS at 07:41

## 2022-06-27 RX ADMIN — SODIUM CHLORIDE, POTASSIUM CHLORIDE, SODIUM LACTATE AND CALCIUM CHLORIDE: 600; 310; 30; 20 INJECTION, SOLUTION INTRAVENOUS at 10:20

## 2022-06-27 RX ADMIN — METOPROLOL TARTRATE 1 MG: 1 INJECTION, SOLUTION INTRAVENOUS at 10:05

## 2022-06-27 RX ADMIN — SUGAMMADEX 200 MG: 100 INJECTION, SOLUTION INTRAVENOUS at 10:17

## 2022-06-27 RX ADMIN — FENTANYL CITRATE 25 MCG: 50 INJECTION, SOLUTION INTRAMUSCULAR; INTRAVENOUS at 11:12

## 2022-06-27 RX ADMIN — CLINDAMYCIN PHOSPHATE 900 MG: 900 INJECTION, SOLUTION INTRAVENOUS at 15:41

## 2022-06-27 RX ADMIN — Medication 15 MG: at 08:02

## 2022-06-27 RX ADMIN — Medication 100 MCG: at 08:25

## 2022-06-27 RX ADMIN — MIDAZOLAM 1 MG: 1 INJECTION, SOLUTION INTRAMUSCULAR; INTRAVENOUS at 07:02

## 2022-06-27 RX ADMIN — Medication 50 MCG: at 07:38

## 2022-06-27 RX ADMIN — LIDOCAINE HYDROCHLORIDE 3 ML: 10 INJECTION, SOLUTION EPIDURAL; INFILTRATION; INTRACAUDAL; PERINEURAL at 07:06

## 2022-06-27 RX ADMIN — DEXAMETHASONE SODIUM PHOSPHATE 10 MG: 10 INJECTION INTRAMUSCULAR; INTRAVENOUS at 08:00

## 2022-06-27 RX ADMIN — Medication 25 MCG: at 09:22

## 2022-06-27 RX ADMIN — ROCURONIUM BROMIDE 50 MG: 10 INJECTION, SOLUTION INTRAVENOUS at 07:42

## 2022-06-27 RX ADMIN — ONDANSETRON 4 MG: 2 INJECTION, SOLUTION INTRAMUSCULAR; INTRAVENOUS at 10:10

## 2022-06-27 ASSESSMENT — PAIN DESCRIPTION - PAIN TYPE: TYPE: SURGICAL PAIN

## 2022-06-27 ASSESSMENT — PAIN DESCRIPTION - LOCATION
LOCATION: KNEE
LOCATION: KNEE;HIP
LOCATION: KNEE

## 2022-06-27 ASSESSMENT — PAIN DESCRIPTION - ORIENTATION
ORIENTATION: LEFT

## 2022-06-27 ASSESSMENT — PAIN SCALES - GENERAL
PAINLEVEL_OUTOF10: 10
PAINLEVEL_OUTOF10: 0
PAINLEVEL_OUTOF10: 4
PAINLEVEL_OUTOF10: 5
PAINLEVEL_OUTOF10: 4

## 2022-06-27 ASSESSMENT — PAIN DESCRIPTION - DESCRIPTORS: DESCRIPTORS: DISCOMFORT

## 2022-06-27 NOTE — FLOWSHEET NOTE
Patient is awake as she reclines in the bed. Patient is calm and engages in conversation. Patient states that she has children for support. Patient requests a prayer and writer offers a prayer for healing, peace, and rest. Patient appears to be coping adequately and expresses appreciation for the visit. Spiritual care will follow as needed. 06/27/22 1911   Encounter Summary   Service Provided For: Patient   Referral/Consult From: [facility]   [facility]   Last Encounter  06/27/22   Complexity of Encounter Moderate   Begin Time 1815   End Time  1830   Total Time Calculated 15 min   Encounter    Type Initial Screen/Assessment   Assessment/Intervention/Outcome   Assessment Calm; Hopeful   Intervention Active listening;Explored Coping Skills/Resources; Explored/Affirmed feelings, thoughts, concerns;Nurtured Hope;Prayer (assurance of)/[place];Sustaining Presence/Ministry of presence   Outcome Expressed Gratitude

## 2022-06-27 NOTE — INTERVAL H&P NOTE
Interval H&P Note    Pt Name: Emilia Mccartney  MRN: [id number]  YOB: 1950  Date of evaluation: 6/27/2022      [x] I have reviewed the H&P by Nabila Kellogg CNP for an Interval History and Physical note. [x] I have examined  Ellen Palomo  There are no changes to the patient who is scheduled for LEFT KNEE TOTAL ARTHROPLASTY - DEPUY MATCHED PARTS by Dahlia Hensley MD for DX LEFT KNEE OA. Hx COPD and took Singulair yesterday  The patient denies new health changes, fever, chills, wheezing, cough, increased SOB, chest pain, open sores or wounds. +DM POC   Last ASA 325mg 6/17/22    Vital signs: BP (!) 156/85   Pulse 90   Temp 97.8 °F (36.6 °C) (Temporal)   Resp 20   Ht 5' 5\" (1.651 m)   Wt 222 lb (100.7 kg)   SpO2 98%   BMI 36.94 kg/m²     Allergies:  Red dye, Adhesive tape, Codeine, Keflex [cephalexin], Kiwi extract, Other, Oxycodone, and Tetracyclines & related    Medications:    Prior to Admission medications    Medication Sig Start Date End Date Taking?  Authorizing Provider   levothyroxine (SYNTHROID) 25 MCG tablet Take 0.025 mcg by mouth Daily    Historical Provider, MD   potassium chloride (MICRO-K) 10 MEQ extended release capsule Take 10 mEq by mouth daily    Historical Provider, MD   Ascorbic Acid (VITAMIN C) 1000 MG tablet Take 2,000 mg by mouth daily Takes (2) 1000mg tablets    Historical Provider, MD   metFORMIN (GLUCOPHAGE-XR) 500 MG extended release tablet Take 1,000 mg by mouth daily (with breakfast)     Historical Provider, MD   enalapril (VASOTEC) 20 MG tablet Take 20 mg by mouth daily    Historical Provider, MD   montelukast (SINGULAIR) 10 MG tablet Take 10 mg by mouth daily    Historical Provider, MD   sertraline (ZOLOFT) 25 MG tablet Take 12.5 mg by mouth daily Takes (1/2) of a 25 mg tablet    Historical Provider, MD   amLODIPine (NORVASC) 10 MG tablet Take 10 mg by mouth daily    Historical Provider, MD   furosemide (LASIX) 20 MG tablet Take 20 mg by mouth daily Historical Provider, MD   cloNIDine (CATAPRES) 0.2 MG tablet Take 0.2 mg by mouth 2 times daily    Historical Provider, MD         This is a 70 y.o.obese female who is pleasant, cooperative, alert and oriented x3, in no acute distress. Heart: Heart sounds are normal.  HR 90 regular rate and rhythm without murmur, gallop or rub. Lungs: Normal respiratory effort with equal expansion, diminished breath sounds, unlabored at rest and clear to auscultation without wheezes or rales bilaterally   Abdomen: soft, round, nontender, nondistended with bowel sounds. Labs:  Recent Labs     06/03/22  1323   HGB 15.5*   HCT 50.4*   WBC 8.9   MCV 86.0         K 4.6      CO2 26   BUN 33*   CREATININE 1.18*   GLUCOSE 139*   INR 1.0   PROTIME 13.0   AST 26   ALT 23   LABALBU 4.3       No results for input(s): COVID19 in the last 720 hours.     ROBY Kelley CNP  Electronically signed 6/27/2022 at 6:16 AM

## 2022-06-27 NOTE — BRIEF OP NOTE
Brief Postoperative Note      Patient: Zaki Islas  YOB: 1950  MRN: [id number]    Date of Procedure: 6/27/2022    Pre-Op Diagnosis: DX LEFT KNEE OA    Post-Op Diagnosis: Same       Procedure(s):  LEFT KNEE TOTAL ARTHROPLASTY - DEPUY    Surgeon(s):  Yoly Rojo MD    Assistant:  Surgical Assistant: Moses Hartley  Resident: Jeanne Alford DO    Anesthesia: General    Estimated Blood Loss (mL): 088     Complications: None    Specimens:   * No specimens in log *    Implants:  Implant Name Type Inv.  Item Serial No.  Lot No. LRB No. Used Action   CEMENT BNE 40GM FULL DOSE PMMA W/O ANTIBIO HI VISC N RADPQ - [id number]  CEMENT BNE 40GM FULL DOSE PMMA W/O ANTIBIO HI VISC N RADPQ  [facility] [employer] [facility] [id number] Left 2 Implanted   STEM FEM L50MM [id number] KNEE NOLBERTO REV ATTUNE - [id number]  STEM FEM L50MM [id number] KNEE NOLBERTO REV ATTUNE  [facility] [employer] [facility] [id number] Left 1 Implanted   BASEPLATE TIB SZ 5 FIX BEAR CO CHROM MOLYBDENUM TI ALLY END - [id number]  BASEPLATE TIB SZ 5 FIX BEAR CO CHROM MOLYBDENUM TI ALLY END  [facility] [employer] [facility]  Left 1 Implanted   COMPONENT PAT [id number] KNEE POLY NOLBERTO MEDIALIZED NICOLETTE ATTUNE - [id number]  COMPONENT PAT [id number] KNEE POLY NOLBERTO MEDIALIZED NICOLETTE ATTUNE  [facility] [employer] [facility] [id number] Left 1 Implanted   COMPONENT FEM SZ 4 L KNEE CO CHROM MOLYBDENUM NOLBERTO W/ ASYM - [id number]  COMPONENT FEM SZ 4 L KNEE CO CHROM MOLYBDENUM NOLBERTO W/ ASYM  [facility] [employer] [facility] [id number] Left 1 Implanted   STEM FEM L50MM [id number] KNEE NOLBERTO REV ATTUNE - [id number]  STEM FEM L50MM [id number] KNEE NOLBERTO REV ATTUNE  [facility] [employer] [facility] [id number] Left 1 Implanted   INSERT TIB SZ 4 [id number] FIX BEAR KNEE UHMWPE ANTIOXIDANT - [id number]  INSERT TIB SZ 4 [id number] FIX BEAR KNEE UHMWPE ANTIOXIDANT  [facility] [employer] [facility] [id number] Left 1 Implanted         Drains: * No LDAs found *    Findings: Severe OA with marked genu sandra    Electronically signed by Manuel Tripathi MD on 6/27/2022 at 10:35 AM

## 2022-06-27 NOTE — ANESTHESIA POSTPROCEDURE EVALUATION
Department of Anesthesiology  Postprocedure Note    Patient: Liborio Elise  MRN: [id number]  YOB: 1950  Date of evaluation: 6/27/2022      Procedure Summary     Date: 06/27/22 Room / Location: [address] - INPATIENT    Anesthesia Start: 6774 Anesthesia Stop: 5578    Procedure: LEFT KNEE TOTAL ARTHROPLASTY - [address] (Left Knee) Diagnosis:       Osteoarthritis of left knee, unspecified osteoarthritis type      (DX LEFT KNEE OA)    Surgeons: Amado Elam MD Responsible Provider: Usama Blue DO    Anesthesia Type: general ASA Status: 3          Anesthesia Type: No value filed.     Abraham Phase I: Abraham Score: 9    Abraham Phase II:        Anesthesia Post Evaluation    Patient location during evaluation: PACU  Patient participation: complete - patient participated  Level of consciousness: awake and alert  Airway patency: patent  Nausea & Vomiting: no nausea and no vomiting  Complications: no  Cardiovascular status: hemodynamically stable  Respiratory status: acceptable  Hydration status: stable

## 2022-06-27 NOTE — ANESTHESIA PROCEDURE NOTES
Peripheral Block    Patient location during procedure: pre-op  Reason for block: post-op pain management and at surgeon's request  Start time: 6/27/2022 7:00 AM  End time: 6/27/2022 7:06 AM  Staffing  Performed: anesthesiologist   Anesthesiologist: Lenore Blanc,   Preanesthetic Checklist  Completed: patient identified, IV checked, site marked, risks and benefits discussed, surgical/procedural consents, equipment checked, pre-op evaluation, timeout performed, anesthesia consent given, oxygen available and monitors applied/VS acknowledged  Peripheral Block   Patient position: supine  Prep: ChloraPrep  Provider prep: mask and sterile gloves  Patient monitoring: cardiac monitor, continuous pulse ox, frequent blood pressure checks and IV access  Block type: Femoral  Adductor canal  Laterality: left  Injection technique: single-shot  Guidance: ultrasound guided  Local infiltration: lidocaine  Infiltration strength: 1 %  Local infiltration: lidocaine  Dose: 3 mL    Needle   Needle type: insulated echogenic nerve stimulator needle   Needle gauge: 21 G  Needle localization: ultrasound guidance  Needle length: 10 cm  Assessment   Injection assessment: negative aspiration for heme, no paresthesia on injection and local visualized surrounding nerve on ultrasound  Paresthesia pain: none  Slow fractionated injection: yes  Hemodynamics: stable  Real-time US image taken/store: yes  Outcomes: uncomplicated    Additional Notes  Left adductor canal single shot  10ml 1.3% exparel + 20ml 0.25% bupivacaine

## 2022-06-27 NOTE — PROGRESS NOTES
Physical Therapy  Facility/Department: [facility] SURG  Physical Therapy Initial Assessment    Name: Jai Sosa  : 1950  MRN: [id number]  Date of Service: 2022    Discharge Recommendations:    Due to recent hospitalization and medical condition, pt would benefit from additional intermittent skilled therapy at time of discharge. Please refer to the AM-PAC score for current functional status. L TKA by Dr. Simran Telles 22      Patient Diagnosis(es): There were no encounter diagnoses. Past Medical History:  has a past medical history of Anxiety, Arthritis, Asthma, CHF (congestive heart failure) ([facility] [address] Cold intolerance, COPD (chronic obstructive pulmonary disease) ([facility] [address] Diabetes mellitus ([facility] [address] Disease of blood and blood forming organ, Heat intolerance, History of blood transfusion, History of fracture of right ankle, Hypertension, and Sickle cell anemia ([facility] [address] Past Surgical History:  has a past surgical history that includes other surgical history; Hysterectomy; fracture surgery (Right); Tonsillectomy and adenoidectomy; hernia repair; Cataract removal with implant (Bilateral); Small intestine surgery;  section; Gastric bypass surgery; Colonoscopy; Rotator cuff repair (Left); Cardiac catheterization; Abdomen surgery; and Total knee arthroplasty (Right, 2020). Assessment   Body Structures, Functions, Activity Limitations Requiring Skilled Therapeutic Intervention: Decreased functional mobility ; Decreased ROM; Increased pain;Decreased strength;Decreased sensation;Decreased balance  Assessment: Pt. able to dangle EOB day of surgery. Groggy and numb LLE from nerve block with drop foot L. Will assess standing/ gait/ stairs POD 1 and prepare for d/c home.   Specific Instructions for Next Treatment: REASSESS  Therapy Prognosis: Excellent  Decision Making: High Complexity  Requires PT Follow-Up: Yes  Activity Tolerance  Activity Tolerance: Patient limited by fatigue;Treatment limited secondary to medical complications  Activity Tolerance Comments: Numb LLE from nerve block/ drop foot--  no standing attempted day of surgery     Plan   Plan  Plan: 2 times a day 7 days a week  Specific Instructions for Next Treatment: REASSESS  Current Treatment Recommendations: Strengthening,ROM,Balance training  Safety Devices  Type of Devices: All jeffrey prominences offloaded,All fall risk precautions in place,Bed alarm in place,Call light within reach,Gait belt,Patient at risk for falls,Nurse notified,Heels elevated for pressure relief     Restrictions  Restrictions/Precautions  Restrictions/Precautions: General Precautions,Fall Risk,Weight Bearing  Lower Extremity Weight Bearing Restrictions  Left Lower Extremity Weight Bearing: Weight Bearing As Tolerated  Position Activity Restriction  Other position/activity restrictions: 4L O2, continuous O2 monitoring in room, L UE IV     Subjective   General  Chart Reviewed: Yes  Patient assessed for rehabilitation services?: Yes  Family / Caregiver Present: No  Follows Commands: Within Functional Limits  Subjective  Subjective: Reports 8/10 pain L knee         Social/Functional History  Social/Functional History  Lives With: Other (comment) (lives with 4 grandkids (15-21 years old). )  Type of Home: House  Home Layout: One level,Able to Live on Main level with bedroom/bathroom,Laundry in basement,Two level (has a loft and a basement, but pt does not need to go up/down stairs)  Home Access: Stairs to enter without rails  Entrance Stairs - Number of Steps: 2-3 (never without someone to help)  Bathroom Shower/Tub: Tub/Shower unit  [place] Toilet: Handicap height  Bathroom Equipment: Grab bars in shower,Tub transfer bench  Home Equipment: Kailyn Jameson, rolling,Reacher,Cane  Has the patient had two or more falls in the past year or any fall with injury in the past year?: No  ADL Assistance: Independent  Homemaking Assistance: Independent  Ambulation Assistance: Independent (no AD typically inside home, uses cane outside of home)  Transfer Assistance: Independent  Active : Yes  Leisure & Hobbies: marta, arts and crafts  Additional Comments: pt is fully independent, does the grocery shopping (uses electric scooter)  Vision/Hearing       Cognition   Orientation  Overall Orientation Status: Within Normal Limits  Cognition  Overall Cognitive Status: Exceptions (cognition appears WFLs, however pt lethargic and drowsy following surgery. Will continue to assess as appropriate)     Objective      Observation/Palpation  Posture: Good  Observation: pt lying in bed, drowsy following surgery. On bedpan upon arrival.  Edema: ace wrap on L LE, olman dressing, knee high sheeba hose on R LE.        AROM RLE (degrees)  RLE AROM: WFL  AROM LLE (degrees)  LLE General AROM: 0-50 degrees knee;  PROM ankle DF to neutral (foot drop/nerve block)  Strength RLE  Strength RLE: WFL  Strength LLE  Comment: foot drop (nerve block)        Balance  Sitting: High guard (SBA/CGA sitting EOB x 5 min; pt unable to safely trial standing this session d/t profound numbness in L LE. Will reassess standing in am)  Standing:  (Unable to assess.)  Bed mobility  Supine to Sit: Minimal assistance;2 Person assistance (head of bed elevated)  Sit to Supine: Moderate assistance;2 Person assistance  Scooting: Minimal assistance (pt. able to use Rt. LE and UEs to scoot self in bed)  Transfers  Comment: Pt. with numb LLE with foot drop and also very groggy. Did not attempt standing at this time. Exercise Treatment:Pt. instructed in quad/glute isometrics to be done x 10 reps every 1-2 hrs. as able. Encouraged pt. to frequently AP for increased LE circulation and perform heel slides as needed to prevent hip/knee stiffness. Static Sitting Balance Exercises: Sat EOB x 5 min., CGA/SBA. Pt. able to scoot along EOB x4 using bilat. LE as able/ bilat. UEs with min A.         OutComes Score AM-PAC Score  AM-PAC Inpatient Mobility Raw Score : 9 (06/27/22 1605)  AM-PAC Inpatient T-Scale Score : 30.55 (06/27/22 1605)  Mobility Inpatient CMS 0-100% Score: 81.38 (06/27/22 1605)  Mobility Inpatient CMS G-Code Modifier : CM (06/27/22 1605)          Goals  Short Term Goals  Time Frame for Short term goals: 6 visits:  Short term goal 1: Pt. To be indep. With bed mob. Using sheet as leg  for surgical LE as needed  Short term goal 2: Pt. To be indep. With transfers with safe hand placement 100% of time  Short term goal 3: Pt. To be indep with gait with RW, 100ft. Short term goal 4: Pt. To safely negotiate 2-3 stairs to prepare for entering home at discharge. Short term goal 5: Pt. To be indep with TKA  HEP  Patient Goals   Patient goals : d/c home tomorrow       Education  Patient Education  Education Given To: Patient  Education Provided: Role of Therapy;Plan of Care;Home Exercise Program  Education Provided Comments: see Ex section, impt. of OOB  Education Outcome: Verbalized understanding;Demonstrated understanding      Therapy Time   Individual Concurrent Group Co-treatment   Time In 1412         Time Out 1502         Minutes 50           Treatment time:  40min. Co-treatment with OT warranted first time up day of surgery. Cotx due to potential risk of decreased sensation, muscle control and proprioception from spinal epidural and/or regional block. Decreased safety and independence requiring 2 skilled therapy professionals to address individual discipline's goals.           Alanis Brunner, PT

## 2022-06-27 NOTE — ANESTHESIA PRE PROCEDURE
Department of Anesthesiology  Preprocedure Note       Name:  Sergio Chacon   Age:  70 y.o.  :  1950                                          MRN:  [id number]         Date:  2022      Surgeon: Sorin Whalen):  Robyn Bloom MD    Procedure: Procedure(s):  LEFT KNEE TOTAL ARTHROPLASTY - DEPUY MATCHED PARTS    Medications prior to admission:   Prior to Admission medications    Medication Sig Start Date End Date Taking?  Authorizing Provider   levothyroxine (SYNTHROID) 25 MCG tablet Take 0.025 mcg by mouth Daily    Historical Provider, MD   potassium chloride (MICRO-K) 10 MEQ extended release capsule Take 10 mEq by mouth daily    Historical Provider, MD   Ascorbic Acid (VITAMIN C) 1000 MG tablet Take 2,000 mg by mouth daily Takes (2) 1000mg tablets    Historical Provider, MD   metFORMIN (GLUCOPHAGE-XR) 500 MG extended release tablet Take 1,000 mg by mouth daily (with breakfast)     Historical Provider, MD   enalapril (VASOTEC) 20 MG tablet Take 20 mg by mouth daily    Historical Provider, MD   montelukast (SINGULAIR) 10 MG tablet Take 10 mg by mouth daily    Historical Provider, MD   sertraline (ZOLOFT) 25 MG tablet Take 12.5 mg by mouth daily Takes (1/2) of a 25 mg tablet    Historical Provider, MD   amLODIPine (NORVASC) 10 MG tablet Take 10 mg by mouth daily    Historical Provider, MD   furosemide (LASIX) 20 MG tablet Take 20 mg by mouth daily    Historical Provider, MD   cloNIDine (CATAPRES) 0.2 MG tablet Take 0.2 mg by mouth 2 times daily    Historical Provider, MD       Current medications:    Current Facility-Administered Medications   Medication Dose Route Frequency Provider Last Rate Last Admin    lactated ringers infusion   IntraVENous Continuous Yamileth Daily,  mL/hr at 22 0651 New Bag at 22 0651    lidocaine 1% (buffered) injection 0.5 mL  0.5 mL Infiltration Once Placido Mccray DO        bupivacaine liposome (EXPAREL) 1.3 % injection 133 mg  10 mL SubCUTAneous Once Placido Mccray,         clindamycin (CLEOCIN) 900 mg in dextrose 5 % 50 mL IVPB  900 mg IntraVENous Once Dahlia Hensley MD        tranexamic acid (CYKLOKAPRON) 1000 MG/10ML injection                Allergies: Allergies   Allergen Reactions    Red Dye Anaphylaxis     Red Dye #40    Adhesive Tape      Paper tape works best    Codeine Other (See Comments)     hallucinations    Keflex [Cephalexin] Swelling    Kiwi Extract Hives and Swelling    Other Hives and Swelling     Tangerines  Exposure to heat or cold causes swelling in her face, ears, hands, and feet.  Oxycodone Other (See Comments)     hallucinations    Tetracyclines & Related Hives       Problem List:    Patient Active Problem List   Diagnosis Code    Type 2 diabetes mellitus without complication, without long-term current use of insulin ([facility]) E11.9    Essential hypertension I10       Past Medical History:        Diagnosis Date    Anxiety     Arthritis     Asthma     CHF (congestive heart failure) ([facility]     x2 episodes, last episode     Cold intolerance     Pt states she develops edema in her face, ears, hands, and feet with exposure to cold temperatures.  COPD (chronic obstructive pulmonary disease) ([facility])     Diabetes mellitus ([facility]     Disease of blood and blood forming organ     mediterranean Sickle cell (remission)    Heat intolerance     Pt states she develops edema in her face, ears, hands, and feet with exposure to heat.  History of blood transfusion     History of fracture of right ankle     Hypertension     Sickle cell anemia (HCC)     Mediterranean sickle cell, in remission past 15 years (per patient 2020)       Past Surgical History:        Procedure Laterality Date    ABDOMEN SURGERY      CARDIAC CATHETERIZATION      x 1. 5-6 years ago (Written 2020). No stents placed per pt.      CATARACT REMOVAL WITH IMPLANT Bilateral      SECTION      x2    COLONOSCOPY      FRACTURE SURGERY Right     ankle    GASTRIC BYPASS SURGERY      HERNIA REPAIR      abdominal x2    HYSTERECTOMY (CERVIX STATUS UNKNOWN)      OTHER SURGICAL HISTORY      benign skin lesions removed from face    ROTATOR CUFF REPAIR Left     SMALL INTESTINE SURGERY      gangrenous bowel    TONSILLECTOMY AND ADENOIDECTOMY      TOTAL KNEE ARTHROPLASTY Right 2/25/2020    RIGHT KNEE TOTAL ARTHROPLASTY - DEPUY STEMMED COMPONENTS performed by Adry Oh MD at [address] History:    Social History     Tobacco Use    Smoking status: Former Smoker     Quit date: 2012     Years since quitting: 10.4    Smokeless tobacco: Never Used   Substance Use Topics    Alcohol use: Not Currently                                Counseling given: Not Answered      Vital Signs (Current):   Vitals:    06/27/22 0613 06/27/22 0703 06/27/22 0705 06/27/22 0709   BP: (!) 156/85 (!) 151/82 134/83 128/84   Pulse: 90 83 94 86   Resp: 20 21 26 21   Temp: 97.8 °F (36.6 °C)      TempSrc: Temporal      SpO2: 98% 96% 96% 97%   Weight: 222 lb (100.7 kg)      Height: 5' 5\" (1.651 m)                                                 BP Readings from Last 3 Encounters:   06/27/22 128/84   06/03/22 (!) 134/96   02/26/20 127/69       NPO Status: Time of last liquid consumption: 2130                        Time of last solid consumption: 2130                        Date of last liquid consumption: 06/26/22                        Date of last solid food consumption: 06/26/22    BMI:   Wt Readings from Last 3 Encounters:   06/27/22 222 lb (100.7 kg)   06/03/22 222 lb (100.7 kg)   02/25/20 229 lb (103.9 kg)     Body mass index is 36.94 kg/m².     CBC:   Lab Results   Component Value Date    WBC 8.9 06/03/2022    RBC 5.86 06/03/2022    HGB 15.5 06/03/2022    HCT 50.4 06/03/2022    MCV 86.0 06/03/2022    RDW 15.7 06/03/2022     06/03/2022       CMP:   Lab Results   Component Value Date     06/03/2022    K 4.6 06/03/2022     06/03/2022 CO2 26 06/03/2022    BUN 33 06/03/2022    CREATININE 1.18 06/03/2022    GFRAA 55 06/03/2022    LABGLOM 45 06/03/2022    GLUCOSE 139 06/03/2022    PROT 7.8 06/03/2022    CALCIUM 9.7 06/03/2022    BILITOT 0.20 06/03/2022    ALKPHOS 60 06/03/2022    AST 26 06/03/2022    ALT 23 06/03/2022       POC Tests:   Recent Labs     06/27/22  0644   POCGLU 124*       Coags:   Lab Results   Component Value Date    PROTIME 13.0 06/03/2022    INR 1.0 06/03/2022       HCG (If Applicable): No results found for: PREGTESTUR, PREGSERUM, HCG, HCGQUANT     ABGs: No results found for: PHART, PO2ART, MNN7XRU, ARU5TWQ, BEART, U5YZDTQP     Type & Screen (If Applicable):  No results found for: LABABO, LABRH    Drug/Infectious Status (If Applicable):  No results found for: HIV, HEPCAB    COVID-19 Screening (If Applicable): No results found for: COVID19        Anesthesia Evaluation  Patient summary reviewed and Nursing notes reviewed no history of anesthetic complications:   Airway: Mallampati: II  TM distance: >3 FB   Neck ROM: full  Mouth opening: > = 3 FB   Dental:          Pulmonary:normal exam    (+) COPD:  asthma:                            Cardiovascular:  Exercise tolerance: no interval change,   (+) hypertension:, CHF:,       ECG reviewed    Rate: normal      Cleared by cardiology              Neuro/Psych:               GI/Hepatic/Renal:        (-) GERD       Endo/Other:    (+) Diabetes, . Abdominal:   (+) obese,           Vascular: Other Findings:           Anesthesia Plan      general     ASA 3       Induction: intravenous. MIPS: Postoperative opioids intended and Prophylactic antiemetics administered. Anesthetic plan and risks discussed with patient. Plan discussed with CRNA.     Attending anesthesiologist reviewed and agrees with Preprocedure content                Juan Kaur DO   6/27/2022

## 2022-06-27 NOTE — PROGRESS NOTES
Orthopedic Coordinator Note    Patient s/p left total Knee replacement on 06/27/22WITH DR. Katelyn Nicole. The following appointments are currently scheduled:    Post-op with surgeon 7/14/22 AT 1100. Physical Therapy UNSURE, PT WILL STAY OVERNIGHT, WILL DETERMINE HHC NEEDS AFTER PT/OT. Wheeled walker order is not entered  Face to face documentation is N/A, PT HAS A WALKER    PT WILL TAKE AN OTC EC 325MG ASA BID FOR 30 DAYS FOR DVT PROPHYLAXIS. PT HAS NORCO ESCRIBED BY DR. Katelyn Nicole TO [facility] ON [address].       Any questions please contact Danny Omer RN, BSN      Electronically signed by: Danny Omer RN on 6/27/2022 at 9:15 AM

## 2022-06-27 NOTE — OP NOTE
Operative Note      Patient: Nenita Clarke  YOB: 1950  MRN: [id number]    Date of Procedure: 6/27/2022    Pre-Op Diagnosis: LEFT KNEE OA, severe genu valgum    Post-Op Diagnosis: Same       Procedure(s):  LEFT KNEE TOTAL ARTHROPLASTY - DEPUY    Surgeon(s):  Obi Ivory MD    Assistant:   Surgical Assistant: Jaquelin Reeves  Resident: Sarita Patrick DO    Anesthesia: General    Estimated Blood Loss (mL): 429     Complications: None    Specimens:   * No specimens in log *    Implants:  Implant Name Type Inv.  Item Serial No.  Lot No. LRB No. Used Action   CEMENT BNE 40GM FULL DOSE PMMA W/O ANTIBIO HI VISC N RADPQ - [id number]  CEMENT BNE 40GM FULL DOSE PMMA W/O ANTIBIO HI VISC N RADPQ  [facility] [id number] Left 2 Implanted   STEM FEM L50MM [id number] KNEE NOLBERTO REV ATTUNE - [id number]  STEM FEM L50MM [id number] KNEE NOLBERTO REV ATTUNE  [facility] [employer] [facility] [id number] Left 1 Implanted   BASEPLATE TIB SZ 5 FIX BEAR CO CHROM MOLYBDENUM TI ALLY END - [id number]  BASEPLATE TIB SZ 5 FIX BEAR CO CHROM MOLYBDENUM TI ALLY END  [facility] [employer] [facility]  Left 1 Implanted   COMPONENT PAT [id number] KNEE POLY NOLBERTO MEDIALIZED NICOLETTE ATTUNE - [id number]  COMPONENT PAT [id number] KNEE POLY NOLBERTO MEDIALIZED NICOLETTE ATTUNE  [facility] [employer] [facility] [id number] Left 1 Implanted   COMPONENT FEM SZ 4 L KNEE CO CHROM MOLYBDENUM NOLBERTO W/ ASYM - [id number]  COMPONENT FEM SZ 4 L KNEE CO CHROM MOLYBDENUM NOLBERTO W/ ASYM  [facility] [employer] [facility] [place] [id number] Left 1 Implanted   STEM FEM L50MM [id number] KNEE NOLBERTO REV ATTUNE - [id number]  STEM FEM L50MM [id number] KNEE NOLBERTO REV ATTUNE  [facility] [employer] [facility] [id number] Left 1 Implanted   INSERT TIB SZ 4 [id number] FIX BEAR KNEE UHMWPE ANTIOXIDANT - [id number]  INSERT TIB SZ 4 [id number] FIX BEAR KNEE UHMWPE ANTIOXIDANT  [facility] [employer] [facility] [place] [id number] Left 1 Implanted         Drains: * No LDAs found *    Findings: Severe OA with marked genu sandra      Detailed Description of Procedure: Indications  The patient has had severe pain and arthritis of the left knee, unresponsive to conservative treatment. It is now recommended that the patient have total knee replacement. After discussion of the particulars of surgery, risks and benefits as well as alternative treatments, I believe that all of their questions were answered to their satisfaction. Informed consent is now given to proceed with surgery as discussed. Procedure  After proper patient identification and marking of the surgical site in the preoperative area, and following update of the history and physical examination, the patient was brought to the operative suite and placed on the table in supine position. The patient was identified as [address] [place] [address]. An adequate anesthetic was induced. A tourniquet was applied to the thigh and then the lower extremity was prepped and draped free in the usual sterile fashion. After marking the skin, the knife was taken and the skin was incised in the anterior midline at the knee, deepening the incision through subcutaneous tissue to deep retinaculum. Minimal flap development was undertaken. A medial capsulotomy was made, not violating the quadriceps tendon, and the patella was tipped laterally to inspect the joint surfaces. At this time, meticulous hemostasis was undertaken. There was advanced tricompartmental degenerative change, warranting knee replacement as planned. Therefore, attention was first directed to the patella. The patellar articular surface was removed with a saw, and the patella was then sized for resurfacing and subluxated laterally. Attention was directed to the femur. The femoral cutting guide was used to resect the femur at the templated valgus angle and then the femoral guides were used to indicate the #4 size, as with her prior right TKR sizing.  Next the remaining anterior and posterior as well as chamfer and femoral notch cuts were made. Canal reaming was performed to allow use of an extension stem. The stemmed trial femoral component was then fitted and noted to seat adequately. Now attention was directed to the tibia. The tibial cutting guide was used to resect the tibial plateau at the templated posterior slope, a few millimeters below the major tibial defect. The tibial sizers were used to indicate the #5 size. The tibial canal was reamed to allow a stem extension as well. Now, with these trial stemmed femoral and tibial components in place, the knee was articulated and moved through a full range of motion using the size 35 mm anatomic patellar button trial and the 18 mm PS trial tibial polyethylene spacer. Excellent stability and restoration of anatomic alignment as well as satisfactory patellar tracking were noted with this arrangement of trial components. The limb was then elevated and exsanguinated, and the tourniquet was inflated to 300 mmHg. Next the trial components were removed and the cut surfaces of bone were cleaned and dried while methylmethacrylate cement was prepared. The actual components selected for implantation were identical to the trial sizes used, and these were inserted and cemented in place in sequential order. After curing of the cement, the tourniquet was deflated after 16 minutes of total tourniquet time. Bleeding points were controlled by electrocautery, and the wound was then copiously irrigated and closed in a layerwise fashion by the first assistant, Girish To DO, without the use of a drain. Sponge and needle counts were correct per the circulating nurse. Sterile dressings were applied and anesthesia was reversed. The patient was then able to be transported to the recovery room in good condition, having tolerated the procedure without complications and with approximately 200 mL of blood loss.  The particulars of surgery as well as the condition of the patient postoperatively were discussed with the patients family thereafter.     Electronically signed by Kanika Loza MD on 6/27/2022 at 10:41 AM

## 2022-06-27 NOTE — PROGRESS NOTES
Occupational Therapy  Facility/Department: [facility] MED SURG  Occupational Therapy Initial Assessment    Name: Bereket Man  : 1950  MRN: [id number]  Date of Service: 2022      S/P L TKA 2022      Discharge Recommendations:  Patient would benefit from continued therapy after discharge Due to recent hospitalization and medical condition, pt would benefit from additional intermittent skilled therapy at time of discharge. Please refer to the AM-PAC score for current functional status. RN reports patient is medically stable for therapy treatment this date. Chart reviewed prior to treatment and patient is agreeable for therapy. All lines intact and patient positioned comfortably at end of treatment. All patient needs addressed prior to ending therapy session. Patient Diagnosis(es): There were no encounter diagnoses. Past Medical History:  has a past medical history of Anxiety, Arthritis, Asthma, CHF (congestive heart failure) ([address] Cold intolerance, COPD (chronic obstructive pulmonary disease) ([address] Diabetes mellitus ([address] Disease of blood and blood forming organ, Heat intolerance, History of blood transfusion, History of fracture of right ankle, Hypertension, and Sickle cell anemia ([address] Past Surgical History:  has a past surgical history that includes other surgical history; Hysterectomy; fracture surgery (Right); Tonsillectomy and adenoidectomy; hernia repair; Cataract removal with implant (Bilateral); Small intestine surgery;  section; Gastric bypass surgery; Colonoscopy; Rotator cuff repair (Left); Cardiac catheterization; Abdomen surgery; and Total knee arthroplasty (Right, 2020). Assessment   Performance deficits / Impairments: Decreased functional mobility ; Decreased ADL status; Decreased safe awareness;Decreased balance;Decreased endurance;Decreased posture;Decreased sensation  Assessment: Will plan to reassess in am for mobility and transfers. Prognosis: Good  Decision Making: Medium Complexity  REQUIRES OT FOLLOW-UP: Yes  Activity Tolerance  Activity Tolerance: Treatment limited secondary to medical complications (free text); Patient limited by fatigue  Activity Tolerance Comments: pt limited by numbness in surgical LE and fatigue. Plan   Plan  Times per Week: 5-6x/week, 1-2x/day  Current Treatment Recommendations: Balance training,Functional mobility training,Endurance training,Positioning,Equipment evaluation, education, & procurement,Patient/Caregiver education & training,Safety education & training,Self-Care / ADL,Home management training     Restrictions  Restrictions/Precautions  Restrictions/Precautions: General Precautions,Fall Risk,Weight Bearing  Lower Extremity Weight Bearing Restrictions  Left Lower Extremity Weight Bearing: Weight Bearing As Tolerated  Position Activity Restriction  Other position/activity restrictions: 4L O2, continuous O2 monitoring in room, L UE IV    Subjective   General  Chart Reviewed: Yes  Patient assessed for rehabilitation services?: Yes  Family / Caregiver Present: No  Subjective  Subjective: pt reporting surgical leg as feeling numb     Social/Functional History  Social/Functional History  Lives With: Other (comment) (lives with 4 grandkids (15-21 years old). )  Type of Home: House  Home Layout: One level,Able to Live on Main level with bedroom/bathroom,Laundry in basement,Two level (has a loft and a basement, but pt does not need to go up/down stairs)  Home Access: Stairs to enter without rails  Entrance Stairs - Number of Steps: 2-3 (never without someone to help)  Bathroom Shower/Tub: Tub/Shower unit  [employer] Toilet: Handicap height  Bathroom Equipment: Grab bars in shower,Tub transfer bench  Home Equipment: Marlen Shi, rolling,Reacher,Cane  Has the patient had two or more falls in the past year or any fall with injury in the past year?: No  ADL Assistance: [address] [place] [address] Avenue: Independent  Ambulation Assistance: Independent (no AD typically inside home, uses cane outside of home)  Transfer Assistance: Independent  Active : Yes  Leisure & Hobbies: marta, arts and crafts  Additional Comments: pt is fully independent, does the grocery shopping (uses electric scooter)       Objective      Observation/Palpation  Posture: Good  Observation: pt lying in bed, drowsy following surgery. On bedpan upon arrival.  Edema: ace wrap on L LE, olman dressing, knee high sheeba hose on R LE. Safety Devices  Type of Devices: All jeffrey prominences offloaded; All fall risk precautions in place; Bed alarm in place;Call light within reach;Gait belt;Patient at risk for falls;Nurse notified; Heels elevated for pressure relief  Balance  Sitting: High guard (SBA/CGA sitting EOB x 5 min; pt unable to safely trial standing this session d/t profound numbness in L LE. Will reassess standing in am)  Standing:  (Unable to assess.) Pt on 4L O2 throughout session with sats remaining in high 90's. AROM: Within functional limits  Strength: Within functional limits (B UE's 5/5)  Coordination: Within functional limits  Tone: Normal  Sensation: Impaired (limited sensation d/t nerve block; diminished to light touch on medial calf. Unable to dorsiflex)  ADL  Feeding: Independent;Setup  Grooming: Setup;Stand by assistance  UE Bathing: Minimal assistance  LE Bathing: Maximum assistance  UE Dressing: Minimal assistance  LE Dressing: Maximum assistance  Toileting: Maximum assistance (bedpan this session; pt unable to safely attempt transfer to toilet or BSC)  Additional Comments: pt generally educated on sheeba hose, ace wrap, olman dressing, and plan for full ADL session prior to d/c with shower/dressing tasks.  Pt drowsy throughout     Activity Tolerance  Activity Tolerance: Patient limited by fatigue;Treatment limited secondary to medical complications  Activity Tolerance Comments: Numb LLE from nerve block/ drop foot--  no standing attempted day of surgery  Bed mobility  Supine to Sit: Minimal assistance;2 Person assistance  Sit to Supine: Moderate assistance;2 Person assistance  Scooting: Minimal assistance  Transfers  Sit to stand: Unable to assess  Stand to sit: Unable to assess  Transfer Comments: pt too numb to safely trial standing. Will reassess in am. RN notified and aware     Cognition  Overall Cognitive Status: Exceptions (cognition appears WFLs, however pt lethargic and drowsy following surgery. Will continue to assess as appropriate)  Perception  Overall Perceptual Status: [facility]               Education Given To: Patient  Education Provided: Role of Therapy;Plan of Care;Family Education;Equipment;Precautions; ADL Adaptive Strategies;Transfer Training;Energy Conservation; Fall Prevention Strategies  Education Method: Demonstration;Verbal  Barriers to Learning: Other (Comment) (drowsiness)  Education Outcome: Continued education needed            AM-[facility] Inpatient Daily Activity Raw Score: 16 (06/27/22 1632)  AM-PAC Inpatient ADL T-Scale Score : 35.96 (06/27/22 1632)  ADL Inpatient CMS 0-100% Score: 53.32 (06/27/22 1632)  ADL Inpatient CMS G-Code Modifier : CK (06/27/22 1632)    Goals  Short Term Goals  Time Frame for Short term goals: by discharge, pt will  Short Term Goal 1: tolerate reassessment of ADL transfers and functional mob to establish approp goals  Short Term Goal 2: demo SBA with toileting routine with approp AD/DME and good safety  Short Term Goal 3: demo I with UB ADLs and min A LB ADLs with AE/DME as approp and good safety/pacing  Short Term Goal 4: demo and verb good understanding of education provided on TKA precautions, possible equip needs, fall prevention, EC/WS techs, and d/c recommendations  Patient Goals   Patient goals : to go home tomorrow       Therapy Time   Individual Concurrent Group Co-treatment   Time In 1428         Time Out 1522         Minutes 54          treatment min: 36  Co-treatment with PT warranted first time up day of surgery. Cotx due to potential risk of decreased sensation, muscle control and proprioception from spinal epidural and/or regional block. Decreased safety and independence requiring 2 skilled therapy professionals to address individual discipline's goals. Pt educated on course of therapy at hospital, fall prevention techs, walker/equip safety, olman dressing, use of support hose, infection protection, and possible home needs inc. Possible equip needs for ADLs/IADLs.         Lennox [place], OT

## 2022-06-27 NOTE — CONSULTS
[facility].,    Adult Hospitalist      Name: Nadira Ramos  MRN: [id number]      Acct: [de-identified]  Room: 2025/2025-01    Admit Date: 6/27/2022  5:30 AM  PCP: David Sherman MD    Primary Problem  Principal Problem (Resolved):    Primary osteoarthritis of left knee  Active Problems: Total knee replacement status, left        Assesment:     · Severe left knee osteoarthritis  · Status post left knee total arthroplasty dated 6/27/2022  · Chronic obstructive pulmonary disease, unspecified  · Hypothyroidism  · Diabetes mellitus type 2  · Essential hypertension  · Major depressive disorder  · Obesity with BMI 36.9        Plan:     · Admit to MedSurg  · Monitor vitals closely  · Keep SPO2 above 90%  · I's and O's  · IV fluids  · Pain control  · Antiemetics as needed  · Resume essential home medication  · Add parameters, however for antihypertensive  · Accu-Cheks before meals and at bedtime  · Insulin sliding scale  · Hypoglycemia protocol  · Labs as needed  · Discussed with nursing  · DVT and GI prophylaxis. Chief Complaint:     No chief complaint on file. Medical management    History of Present Illness:      Nadira Ramos is a 70 y.o.  female who presents with No chief complaint on file. Patient admitted after undergoing left total knee arthroplasty without incident. Patient denies any nausea and says her pain is adequately controlled. Denies chest pain, dyspnea or orthopnea. Denies vomiting, diarrhea or constipation. Denies abdominal pain or back pain. Denies headache or vision change    I have personally reviewed the past medical history, past surgical history, medications, social history, and family history, and summarized in the note. Review of Systems:     All 10 point system is reviewed and negative otherwise mentioned in HPI.       Past Medical History:     Past Medical History:   Diagnosis Date    Anxiety     Arthritis     Asthma     CHF (congestive heart (GLUCOPHAGE-XR) 500 MG extended release tablet Take 1,000 mg by mouth daily (with breakfast)     Historical Provider, MD   enalapril (VASOTEC) 20 MG tablet Take 20 mg by mouth daily    Historical Provider, MD   montelukast (SINGULAIR) 10 MG tablet Take 10 mg by mouth daily    Historical Provider, MD   sertraline (ZOLOFT) 25 MG tablet Take 12.5 mg by mouth daily Takes (1/2) of a 25 mg tablet    Historical Provider, MD   amLODIPine (NORVASC) 10 MG tablet Take 10 mg by mouth daily    Historical Provider, MD   furosemide (LASIX) 20 MG tablet Take 20 mg by mouth daily    Historical Provider, MD   cloNIDine (CATAPRES) 0.2 MG tablet Take 0.2 mg by mouth 2 times daily    Historical Provider, MD        Allergies:       Red dye, Adhesive tape, Codeine, Keflex [cephalexin], Kiwi extract, Other, Oxycodone, and Tetracyclines & related    Social History:     Tobacco:    reports that she quit smoking about 10 years ago. She has never used smokeless tobacco.  Alcohol:      reports previous alcohol use. Drug Use:  reports no history of drug use.     Family History:     Family History   Problem Relation Age of Onset    Heart Disease Father     Heart Attack Paternal Grandmother          Physical Exam:     Vitals:  /77   Pulse 88   Temp 97.5 °F (36.4 °C) (Axillary)   Resp 18   Ht 5' 5\" (1.651 m)   Wt 222 lb (100.7 kg)   SpO2 94%   BMI 36.94 kg/m²   Temp (24hrs), Av.1 °F (36.2 °C), Min:96.6 °F (35.9 °C), Max:97.8 °F (36.6 °C)          General appearance - alert, well appearing, and in no acute distress  Mental status - oriented to person, place, and time with normal affect  Head - normocephalic and atraumatic  Eyes - pupils equal and reactive, extraocular eye movements intact, conjunctiva clear  Ears - hearing appears to be intact  Nose - no drainage noted  Mouth - mucous membranes moist  Neck - supple, no carotid bruits, thyroid not palpable  Chest - clear to auscultation, normal effort   Heart - normal rate, regular rhythm, no murmur  Abdomen - soft, nontender, nondistended, bowel sounds present all four quadrants, no masses, hepatomegaly or splenomegaly  Neurological - normal speech, no focal findings or movement disorder noted, cranial nerves II through XII grossly intact  Extremities - peripheral pulses palpable, no pedal edema or calf pain with palpation. Left knee dressing  Skin - no gross lesions, rashes, or induration noted        Data:     Labs:    Hematology:No results for input(s): WBC, RBC, HGB, HCT, MCV, MCH, MCHC, RDW, PLT, MPV, SEDRATE, CRP, INR, DDIMER, NG4PUQWX, LABABSO in the last 72 hours. Invalid input(s): PT  Chemistry:No results for input(s): NA, K, CL, CO2, GLUCOSE, BUN, CREATININE, MG, ANIONGAP, LABGLOM, GFRAA, CALCIUM, CAION, PHOS, PSA, PROBNP, TROPHS, CKTOTAL, CKMB, CKMBINDEX, MYOGLOBIN, DIGOXIN, LACTACIDWB in the last 72 hours. Recent Labs     06/27/22  0644 06/27/22  1042 06/27/22  1145   POCGLU 124* 247* 204*       Lab Results   Component Value Date    INR 1.0 06/03/2022    PROTIME 13.0 06/03/2022       Lab Results   Component Value Date/Time    SPECIAL NOT REPORTED 02/06/2020 11:25 AM     Lab Results   Component Value Date/Time    CULTURE ESCHERICHIA COLI 50 to 100,000 CFU/ML (A) 06/03/2022 01:00 PM       No results found for: POCPH, PHART, PH, POCPCO2, EQG4AVU, PCO2, POCPO2, PO2ART, PO2, POCHCO3, VIA8JPI, HCO3, NBEA, PBEA, BEART, BE, THGBART, THB, RGJ8NOY, JHDT2RBS, J6BEBFRP, O2SAT, FIO2    Radiology:    No results found. All radiological studies reviewed                Code Status:  Full Code    Electronically signed by Shauna Combs MD on 6/27/2022 at 3:58 PM     Copy sent to Dr. Batsheva Morales MD    This note was created with the assistance of a speech-recognition program.  Although the intention is to generate a document that actually reflects the content of the visit, no guarantees can be provided that every mistake has been identified and corrected by editing.      Note was updated later by me after  physical examination and  completion of the assessment.

## 2022-06-27 NOTE — PHYSICIAN ADVISORY
The patient was counseled at length about the risks of sinan Covid-19 during their perioperative period and any recovery window from their procedure. The patient was made aware that sinan Covid-19  may worsen their prognosis for recovering from their procedure  and lend to a higher morbidity and/or mortality risk. All material risks, benefits, and reasonable alternatives including postponing the procedure were discussed. The patient does wish to proceed with the procedure at this time.

## 2022-06-28 VITALS
RESPIRATION RATE: 16 BRPM | BODY MASS INDEX: 36.99 KG/M2 | WEIGHT: 222 LBS | TEMPERATURE: 97.3 F | DIASTOLIC BLOOD PRESSURE: 64 MMHG | HEART RATE: 90 BPM | SYSTOLIC BLOOD PRESSURE: 111 MMHG | OXYGEN SATURATION: 92 % | HEIGHT: 65 IN

## 2022-06-28 LAB
GLUCOSE BLD-MCNC: 145 MG/DL (ref 65–105)
GLUCOSE BLD-MCNC: 159 MG/DL (ref 65–105)

## 2022-06-28 PROCEDURE — 97110 THERAPEUTIC EXERCISES: CPT

## 2022-06-28 PROCEDURE — 97535 SELF CARE MNGMENT TRAINING: CPT

## 2022-06-28 PROCEDURE — 97530 THERAPEUTIC ACTIVITIES: CPT

## 2022-06-28 PROCEDURE — 82947 ASSAY GLUCOSE BLOOD QUANT: CPT

## 2022-06-28 PROCEDURE — 97530 THERAPEUTIC ACTIVITIES: CPT | Performed by: NURSE PRACTITIONER

## 2022-06-28 PROCEDURE — 6370000000 HC RX 637 (ALT 250 FOR IP): Performed by: ORTHOPAEDIC SURGERY

## 2022-06-28 PROCEDURE — 2580000003 HC RX 258: Performed by: ORTHOPAEDIC SURGERY

## 2022-06-28 PROCEDURE — 97116 GAIT TRAINING THERAPY: CPT

## 2022-06-28 PROCEDURE — 97535 SELF CARE MNGMENT TRAINING: CPT | Performed by: NURSE PRACTITIONER

## 2022-06-28 RX ADMIN — SODIUM CHLORIDE, PRESERVATIVE FREE 10 ML: 5 INJECTION INTRAVENOUS at 08:28

## 2022-06-28 RX ADMIN — HYDROCODONE BITARTRATE AND ACETAMINOPHEN 2 TABLET: 5; 325 TABLET ORAL at 08:19

## 2022-06-28 RX ADMIN — SODIUM CHLORIDE, POTASSIUM CHLORIDE, SODIUM LACTATE AND CALCIUM CHLORIDE: 600; 310; 30; 20 INJECTION, SOLUTION INTRAVENOUS at 05:42

## 2022-06-28 RX ADMIN — SERTRALINE HYDROCHLORIDE 12.5 MG: 25 TABLET, FILM COATED ORAL at 10:23

## 2022-06-28 RX ADMIN — LEVOTHYROXINE SODIUM 25 MCG: 0.03 TABLET ORAL at 05:43

## 2022-06-28 RX ADMIN — POLYETHYLENE GLYCOL 3350 17 G: 17 POWDER, FOR SOLUTION ORAL at 08:35

## 2022-06-28 RX ADMIN — ASPIRIN 325 MG: 325 TABLET, COATED ORAL at 08:19

## 2022-06-28 RX ADMIN — POTASSIUM CHLORIDE 10 MEQ: 750 TABLET, FILM COATED, EXTENDED RELEASE ORAL at 08:28

## 2022-06-28 RX ADMIN — CLONIDINE HYDROCHLORIDE 0.2 MG: 0.2 TABLET ORAL at 08:28

## 2022-06-28 RX ADMIN — ENALAPRIL MALEATE 20 MG: 20 TABLET ORAL at 08:28

## 2022-06-28 RX ADMIN — AMLODIPINE BESYLATE 10 MG: 10 TABLET ORAL at 08:28

## 2022-06-28 RX ADMIN — METFORMIN HYDROCHLORIDE 1000 MG: 500 TABLET, EXTENDED RELEASE ORAL at 10:23

## 2022-06-28 RX ADMIN — MELOXICAM 3.75 MG: 7.5 TABLET ORAL at 08:18

## 2022-06-28 RX ADMIN — HYDROCODONE BITARTRATE AND ACETAMINOPHEN 2 TABLET: 5; 325 TABLET ORAL at 01:23

## 2022-06-28 RX ADMIN — MONTELUKAST SODIUM 10 MG: 10 TABLET ORAL at 08:28

## 2022-06-28 ASSESSMENT — PAIN SCALES - GENERAL
PAINLEVEL_OUTOF10: 8
PAINLEVEL_OUTOF10: 6
PAINLEVEL_OUTOF10: 7
PAINLEVEL_OUTOF10: 6

## 2022-06-28 ASSESSMENT — PAIN DESCRIPTION - DESCRIPTORS
DESCRIPTORS: DISCOMFORT
DESCRIPTORS: CRAMPING

## 2022-06-28 ASSESSMENT — PAIN - FUNCTIONAL ASSESSMENT
PAIN_FUNCTIONAL_ASSESSMENT: PREVENTS OR INTERFERES SOME ACTIVE ACTIVITIES AND ADLS
PAIN_FUNCTIONAL_ASSESSMENT: PREVENTS OR INTERFERES SOME ACTIVE ACTIVITIES AND ADLS

## 2022-06-28 ASSESSMENT — PAIN DESCRIPTION - LOCATION
LOCATION: KNEE
LOCATION: LEG

## 2022-06-28 ASSESSMENT — PAIN DESCRIPTION - PAIN TYPE: TYPE: SURGICAL PAIN

## 2022-06-28 ASSESSMENT — PAIN DESCRIPTION - FREQUENCY: FREQUENCY: CONTINUOUS

## 2022-06-28 ASSESSMENT — PAIN DESCRIPTION - ONSET: ONSET: ON-GOING

## 2022-06-28 ASSESSMENT — PAIN DESCRIPTION - ORIENTATION
ORIENTATION: LEFT
ORIENTATION: LEFT

## 2022-06-28 NOTE — PROGRESS NOTES
Occupational Therapy  Facility/Department: [facility] MED SURG  Occupational Therapy Re-Assessment    Name: Zaki Islas  : 1950  MRN: [id number]  Date of Service: 2022    Discharge Recommendations:  Patient would benefit from continued therapy after discharge Due to recent hospitalization and medical condition, pt would benefit from additional intermittent skilled therapy at time of discharge. Please refer to the AM-PAC score for current functional status. RN reports patient is medically stable for therapy treatment this date. Chart reviewed prior to treatment and patient is agreeable for therapy. All lines intact and patient positioned comfortably at end of treatment. All patient needs addressed prior to ending therapy session. Patient Diagnosis(es): There were no encounter diagnoses. Past Medical History:  has a past medical history of Anxiety, Arthritis, Asthma, CHF (congestive heart failure) ([address] Cold intolerance, COPD (chronic obstructive pulmonary disease) ([address] Diabetes mellitus ([address] Disease of blood and blood forming organ, Heat intolerance, History of blood transfusion, History of fracture of right ankle, Hypertension, and Sickle cell anemia ([address] Past Surgical History:  has a past surgical history that includes other surgical history; Hysterectomy; fracture surgery (Right); Tonsillectomy and adenoidectomy; hernia repair; Cataract removal with implant (Bilateral); Small intestine surgery;  section; Gastric bypass surgery; Colonoscopy; Rotator cuff repair (Left); Cardiac catheterization; Abdomen surgery; and Total knee arthroplasty (Right, 2020). Assessment   Performance deficits / Impairments: Decreased functional mobility ; Decreased ADL status; Decreased safe awareness;Decreased balance;Decreased endurance;Decreased posture;Decreased sensation  Assessment: Skilled OT is indicated to increase overall safety awareness in function as well as strenght, balance, ADL status, functional mobility, and cognition to improve functional outcomes, I, and return to home. Prognosis: Good  REQUIRES OT FOLLOW-UP: Yes  Activity Tolerance  Activity Tolerance: Treatment limited secondary to medical complications (free text); Patient limited by fatigue  Activity Tolerance Comments: pt limited by numbness in surgical LE and fatigue. Plan   Plan  Times per Week: 5-6x/week, 1-2x/day  Current Treatment Recommendations: Balance training,Functional mobility training,Endurance training,Positioning,Equipment evaluation, education, & procurement,Patient/Caregiver education & training,Safety education & training,Self-Care / ADL,Home management training,Neuromuscular re-education,Cognitive/Perceptual training     Restrictions  Restrictions/Precautions  Restrictions/Precautions: General Precautions,Fall Risk,Weight Bearing  Lower Extremity Weight Bearing Restrictions  Left Lower Extremity Weight Bearing: Weight Bearing As Tolerated  Position Activity Restriction  Other position/activity restrictions: L UE IV, room air    Subjective   General  Chart Reviewed: Yes  Patient assessed for rehabilitation services?: Yes  Family / Caregiver Present: No  Subjective  Subjective: No numbness reported, however states pain has been bad and kept her up all night     Social/Functional History  Social/Functional History  Lives With: Other (comment) (lives with 4 grandkids (15-21 years old). )  Type of Home: House  Home Layout: One level,Able to Live on Main level with bedroom/bathroom,Laundry in basement,Two level (has a loft and a basement, but pt does not need to go up/down stairs)  Home Access: Stairs to enter without rails  Entrance Stairs - Number of Steps: 2-3 (never without someone to help)  Bathroom Shower/Tub: Tub/Shower unit  [place] Toilet: Handicap height  Bathroom Equipment: Grab bars in shower,Tub transfer bench  Home Equipment: Nestora Dom, rolling,Reacher,Cane  Has the patient had two or more falls in the past year or any fall with injury in the past year?: No  ADL Assistance: Independent  Homemaking Assistance: Independent  Ambulation Assistance: Independent (no AD typically inside home, uses cane outside of home)  Transfer Assistance: Independent  Active : Yes  Leisure & Hobbies: marta, arts and crafts  Additional Comments: pt is fully independent, does the grocery shopping (uses electric scooter)       Objective   Heart Rate: 88  Heart Rate Source: Monitor  BP: 128/63  BP Location: Right Arm  MAP (Calculated): 84.67  Resp: 16  SpO2: 91 %  O2 Device: None (Room air)  Vision Exceptions: Wears glasses for reading  Hearing: Within functional limits       Observation/Palpation  Posture: Fair (forward flexed posture, cues throughout to maintain upright posture)  Edema: ace wrap removed from L LE, B knee high sheeba hose applied. Safety Devices  Type of Devices: All fall risk precautions in place;Call light within reach;Gait belt;Patient at risk for falls;Nurse notified; Heels elevated for pressure relief;Left in chair;Chair alarm in place  Balance  Sitting: Intact  Standing: With support (CGA/min A; cues for posture, RW safety. Fair carryover)  Gait  Overall Level of Assistance: Minimum assistance (to/from bathroom. Cues for RW safety, posture, and techs to stay within device during all mob, turns, navigating doorways/etc. Pt needing repetition of all instructions and cues to inc. follow through)  Interventions: Verbal cues; Safety awareness training;Demonstration  Base of Support: Narrowed  Speed/Karin: Slow  Assistive Device: Walker, rolling  Toilet Transfers  Equipment Used: Standard toilet  Toilet Transfer: Minimal assistance  AROM: Within functional limits  Strength:  Within functional limits (B UE's 5/5)  Coordination: Within functional limits  Tone: Normal  Sensation: Intact  ADL  Feeding: Independent;Setup  Grooming: Contact guard assistance  Grooming Skilled Clinical Factors: pt CGA to stand at sink for oral care/hand washing following toileting. Pt needing cues to stand upright, limit flexion/leaning onto sink as well as stepping fully up to sink inside of walker  UE Bathing: Minimal assistance  LE Bathing: Maximum assistance  UE Dressing: Minimal assistance  LE Dressing: Maximum assistance  Toileting: Minimal assistance; Moderate assistance  Toileting Skilled Clinical Factors: pt needing mod A for gown management, able to perform own hygiene  Additional Comments: B junior hose applied and pt educated on wearing schedule as well as olman dressing with regards to showering. Pt ed on ADL techs and encouraged to do as much for self as able        Bed mobility  Supine to Sit: Minimal assistance; Moderate assistance  Bed Mobility Comments: up to chair  Transfers  Sit to stand: Minimal assistance;2 Person assistance  Stand to sit: Minimal assistance;2 Person assistance  Transfer Comments: progressing to min A x 1 with cues for hand placement, RW techs. Fair carryover     Cognition  Overall Cognitive Status: Exceptions (cognition appears WFLs, however pt lethargic and drowsy following surgery. Will continue to assess as appropriate)  Following Commands: Follows multistep commands with repitition  Memory: Appears intact  Safety Judgement: Decreased awareness of need for safety;Decreased awareness of need for assistance  Problem Solving: Assistance required to generate solutions;Assistance required to implement solutions;Assistance required to correct errors made;Decreased awareness of errors  Insights: Decreased awareness of deficits  Initiation: Requires cues for some  Sequencing: Does not require cues                  Education Given To: Patient; Family  Education Provided: Role of Therapy;Plan of Care;Family Education;Equipment;Precautions; ADL Adaptive Strategies;Transfer Training;Energy Conservation; Fall Prevention Strategies  Education Method: Demonstration;Verbal  Barriers to Learning: Cognition  Education Outcome: Continued education needed;Verbalized understanding               AM-PAC Inpatient Daily Activity Raw Score: 16 (06/27/22 1632)  AM-PAC Inpatient ADL T-Scale Score : 35.96 (06/27/22 1632)  ADL Inpatient CMS 0-100% Score: 53.32 (06/27/22 1632)  ADL Inpatient CMS G-Code Modifier : CK (06/27/22 1632)    Goals  Short Term Goals  Time Frame for Short term goals: by discharge, pt will  Short Term Goal 1: demo SBA with ADL transfers with approp AD/DME and good safety  Short Term Goal 2: demo SBA with toileting routine with approp AD/DME and good safety  Short Term Goal 3: demo I with UB ADLs and min A LB ADLs with AE/DME as approp and good safety/pacing  Short Term Goal 4: demo and verb good understanding of education provided on TKA precautions, possible equip needs, fall prevention, EC/WS techs, and d/c recommendations  Short Term Goal 5: demo SBA with functional mob household distances with RW and good safety/pacing for ADL completion  Patient Goals   Patient goals : to go home tomorrow       Therapy Time   Individual Concurrent Group Co-treatment   Time In 0829         Time Out 0936         Minutes [place]

## 2022-06-28 NOTE — ACP (ADVANCE CARE PLANNING)
Advance Care Planning     Advance Care Planning Activator (Inpatient)  Conversation Note      Date of ACP Conversation: 6/28/2022     Conversation Conducted with: Patient with Decision Making Capacity    ACP Activator: Trini Hickman RN    {When Decision Maker makes decisions on behalf of the incapacitated patient: Decision Maker is asked to consider and make decisions based on patient values, known preferences, or best interests. Health Care Decision Maker:     Current Designated Health Care Decision Maker:     Primary Decision Maker: Marianna Emmanuel - Child - [phone]  Click here to complete Healthcare Decision Makers including section of the Healthcare Decision Maker Relationship (ie \"Primary\")      Care Preferences    Ventilation: \"If you were in your present state of health and suddenly became very ill and were unable to breathe on your own, what would your preference be about the use of a ventilator (breathing machine) if it were available to you? \"      Would the patient desire the use of ventilator (breathing machine)?: yes    \"If your health worsens and it becomes clear that your chance of recovery is unlikely, what would your preference be about the use of a ventilator (breathing machine) if it were available to you? \"     Would the patient desire the use of ventilator (breathing machine)?: No      Resuscitation  \"CPR works best to restart the heart when there is a sudden event, like a heart attack, in someone who is otherwise healthy. Unfortunately, CPR does not typically restart the heart for people who have serious health conditions or who are very sick. \"    \"In the event your heart stopped as a result of an underlying serious health condition, would you want attempts to be made to restart your heart (answer \"yes\" for attempt to resuscitate) or would you prefer a natural death (answer \"no\" for do not attempt to resuscitate)? \" yes       [] Yes   [x] No   Educated Patient / Daja Blackwood regarding differences between Advance Directives and portable DNR orders.     Length of ACP Conversation in minutes:  5    Conversation Outcomes:  [x] ACP discussion completed  [] Existing advance directive reviewed with patient; no changes to patient's previously recorded wishes  [] New Advance Directive completed  [] Portable Do Not Rescitate prepared for Provider review and signature  [] POLST/POST/MOLST/MOST prepared for Provider review and signature      Follow-up plan:    [] Schedule follow-up conversation to continue planning  [] Referred individual to Provider for additional questions/concerns   [] Advised patient/agent/surrogate to review completed ACP document and update if needed with changes in condition, patient preferences or care setting    [] This note routed to one or more involved healthcare providers

## 2022-06-28 NOTE — PROGRESS NOTES
Occupational Therapy  Facility/Department: [facility]  Daily Treatment Note  NAME: Emilia Mccartney  : 1950  MRN: [id number]    Date of Service: 2022    Discharge Recommendations:  Patient would benefit from continued therapy after discharge    Due to recent hospitalization and medical condition, pt would benefit from additional intermittent skilled therapy at time of discharge. Please refer to the AM-PAC score for current functional status. Patient Diagnosis(es): There were no encounter diagnoses. Assessment    Assessment: Pt progressing well towards goals and improvement from last session. Pt would benefit from continued skilled OT services to address deficits in areas of functional balance, functional reach, ADL completion, safety awareness, transfers, UE strength and functional mobility, all to ensure safe return home to [facility] and full recovery from TKA. Activity Tolerance: Patient tolerated treatment well  Discharge Recommendations: Patient would benefit from continued therapy after discharge      Plan   Plan  Times per Week: 5-6x/week, 1-2x/day  Current Treatment Recommendations: Balance training;Functional mobility training; Endurance training;Positioning;Equipment evaluation, education, & procurement;Patient/Caregiver education & training; Safety education & training;Self-Care / ADL; Home management training;Neuromuscular re-education;Cognitive/Perceptual training     Restrictions  Restrictions/Precautions  Restrictions/Precautions: General Precautions; Fall Risk;Weight Bearing;Up as Tolerated;Surgical protocol  Lower Extremity Weight Bearing Restrictions  Left Lower Extremity Weight Bearing: Weight Bearing As Tolerated  Position Activity Restriction  Other position/activity restrictions: L UE IV, room air    Subjective   Subjective  Subjective: Pt motivated to complete therapy in order to d/c  Orientation  Overall Orientation Status: Within Functional Limits  Cognition  Overall Cognitive Status: Exceptions  Following Commands: Follows multistep commands with repitition  Memory: Appears intact  Safety Judgement: Decreased awareness of need for safety;Decreased awareness of need for assistance  Problem Solving: Assistance required to generate solutions;Assistance required to implement solutions;Assistance required to correct errors made;Decreased awareness of errors  Insights: Decreased awareness of deficits  Initiation: Requires cues for some  Sequencing: Requires cues for some        Objective       Bed Mobility Training  Bed Mobility Training: Yes  Overall Level of Assistance: Contact-guard assistance;Minimum assistance  Interventions: Safety awareness training;Verbal cues; Tactile cues  Rolling: Contact-guard assistance  Supine to Sit: Contact-guard assistance;Minimum assistance  Sit to Supine: Minimum assistance (With BLE)  Scooting: Contact-guard assistance;Minimum assistance  Balance  Sitting: Intact  Standing: With support  Transfer Training  Transfer Training: Yes  Overall Level of Assistance: Minimum assistance  Interventions: Demonstration; Safety awareness training;Verbal cues; Visual cues  Sit to Stand: Minimum assistance  Stand to Sit: Minimum assistance  Bed to Chair: Minimum assistance  Shower Transfer: Minimum assistance (Transfer from  to TTB with VC for tech with fair carryover.)  Gait Training  Gait Training: Yes  Right Side Weight Bearing: Full  Left Side Weight Bearing: As tolerated  Gait  Overall Level of Assistance: Minimum assistance (From bathroom to bed with VC for safety awareness and min VC for sequencing)  Interventions: Safety awareness training;Verbal cues  Base of Support: Narrowed  Speed/Karin: Slow  Gait Abnormalities: Step to gait  Distance (ft): 10 Feet  Assistive Device: Walker, rolling;Gait belt  Rail Use: Both  Stairs - Level of Assistance: Minimum assistance;Assist X2  Number of Stairs Trained: 5     ADL  Feeding: Independent;Setup  Grooming: Supervision;Setup  Grooming Skilled Clinical Factors: Sitting EOB completing hair and oral care, and applying deodorant. UE Bathing: Stand by assistance;Setup  LE Bathing: Stand by assistance;Setup  UE Dressing: Stand by assistance;Setup  LE Dressing: Maximum assistance;Minimal assistance (Poli with doff/don socks, compression stockings, underwear and pants.)  Toileting: Minimal assistance; Moderate assistance  Toileting Skilled Clinical Factors: pt needing mod A for gown management, able to perform own hygiene  Additional Comments: B junior hose applied and pt educated on wearing schedule as well as olman dressing with regards to showering. Pt ed on ADL techs and encouraged to do as much for self as able        Safety Devices  Type of Devices: All fall risk precautions in place;Call light within reach;Gait belt;Patient at risk for falls;Nurse notified; Heels elevated for pressure relief;Left in chair;Chair alarm in place     Patient Education  Education Given To: Patient  Education Provided: Role of Therapy;Plan of Care;Equipment;Precautions; ADL Adaptive Strategies;Transfer Training;Energy Conservation; Fall Prevention Strategies  Edu provided on proper positioning of surgical LE, edema control, compression stocking wear schedule and don/doff tech, inceptive spirometer and breathing tech. Written edu on incentive spirometer use, tracking, and importance.   Education Method: Demonstration;Verbal  Barriers to Learning: Cognition  Education Outcome: Continued education needed;Verbalized understanding    Goals  Short Term Goals  Time Frame for Short term goals: by discharge, pt will  Short Term Goal 1: demo SBA with ADL transfers with approp AD/DME and good safety  Short Term Goal 2: demo SBA with toileting routine with approp AD/DME and good safety  Short Term Goal 3: demo I with UB ADLs and min A LB ADLs with AE/DME as approp and good safety/pacing  Short Term Goal 4: demo and verb good understanding of education provided on TKA precautions, possible equip needs, fall prevention, EC/WS techs, and d/c recommendations  Short Term Goal 5: demo SBA with functional mob household distances with RW and good safety/pacing for ADL completion  Patient Goals   Patient goals : to go home tomorrow    RN reports patient is medically stable for therapy treatment this date. Chart reviewed prior to treatment and patient is agreeable for therapy. All lines intact and patient positioned comfortably at end of treatment. All patient needs addressed prior to ending therapy session.         Therapy Time   Individual Concurrent Group Co-treatment   Time In 12         Time Out 1350         Minutes 67 +5 mins additional time for personalized written edu                 [facility], DALI

## 2022-06-28 NOTE — PLAN OF CARE
Problem: Chronic Conditions and Co-morbidities  Goal: Patient's chronic conditions and co-morbidity symptoms are monitored and maintained or improved  6/28/2022 1059 by Bety Diaz RN  Outcome: Progressing  Flowsheets (Taken 6/28/2022 0910)  Care Plan - Patient's Chronic Conditions and Co-Morbidity Symptoms are Monitored and Maintained or Improved: Monitor and assess patient's chronic conditions and comorbid symptoms for stability, deterioration, or improvement  6/28/2022 0007 by Warden Aase, RN  Outcome: Progressing  Flowsheets (Taken 6/27/2022 1401 by Belen Barragan RN)  Care Plan - Patient's Chronic Conditions and Co-Morbidity Symptoms are Monitored and Maintained or Improved: Monitor and assess patient's chronic conditions and comorbid symptoms for stability, deterioration, or improvement     Problem: Discharge Planning  Goal: Discharge to home or other facility with appropriate resources  6/28/2022 1059 by Bety Diaz RN  Outcome: Progressing  Flowsheets (Taken 6/28/2022 0910)  Discharge to home or other facility with appropriate resources: Refer to discharge planning if patient needs post-hospital services based on physician order or complex needs related to functional status, cognitive ability or social support system  6/28/2022 0007 by Warden Aase, RN  Outcome: Progressing  Flowsheets (Taken 6/27/2022 1401 by Belen Barragan RN)  Discharge to home or other facility with appropriate resources: Identify barriers to discharge with patient and caregiver     Problem: Pain  Goal: Verbalizes/displays adequate comfort level or baseline comfort level  6/28/2022 1059 by Bety Diaz RN  Outcome: Progressing  6/28/2022 0007 by Warden Aase, RN  Outcome: Progressing  Flowsheets (Taken 6/28/2022 0007)  Verbalizes/displays adequate comfort level or baseline comfort level:   Encourage patient to monitor pain and request assistance   Administer analgesics based on type and severity of pain and evaluate response   Consider cultural and social influences on pain and pain management   Assess pain using appropriate pain scale

## 2022-06-28 NOTE — CARE COORDINATION
Case Management Initial Discharge Plan  Stephanie Palomo,             Met with:patient to discuss discharge plans. Information verified: address, contacts, phone number, , insurance Yes  PCP: Nick Graves MD  Date of last visit: 2022    Insurance Provider: DENNIS Gray    Discharge Planning    Living Arrangements:  Family Members   Support Systems:  Family Members    Home has 2 stories  2-3 stairs to climb to get into front door, 0 stairs to climb to reach second floor  Location of bedroom/bathroom in home  - main floor    Patient able to perform ADL's:Independent    Current Services (outpatient & in home) none  DME equipment: walker, cane, shower seat, glucometer  DME provider: N/A    [facility]: [facility]    Potential Assistance Needed:  N/A    Patient agreeable to home care: No  [place] of choice provided:  n/a    Prior SNF/Rehab Placement and Facility: No  Agreeable to SNF/Rehab: No  [place] of choice provided: n/a   Evaluation: n/a    Expected Discharge date:     Patient expects to be discharged to: Follow Up Appointment: Best Day/ Time: Monday AM    Transportation provider: smith  Transportation arrangements needed for discharge: No    Readmission Risk              Risk of Unplanned Readmission:  0             Does patient have a readmission risk score greater than 14?: No  If yes, follow-up appointment must be made within 7 days of discharge. Goal of Care:       Discharge Plan: Met with patient at bedside. Lives with grandchildren, independent and drives. POD #1 lt total knee replacement. Has walker at bedside. Declines need for HHC. Post op appointment with Dr. Chato Sherman  at [address] [place] [address] Drive is D/C home today.           Electronically signed by Tonya Viera RN on 22 at 8:47 AM EDT

## 2022-06-28 NOTE — PROGRESS NOTES
Patient discharged via wheelchair to home in car with all her belongings in stable condition. All home meds were returned back to the patient.  Patient understood and signed AVS.

## 2022-06-28 NOTE — PROGRESS NOTES
Physical Therapy  Facility/Department: [facility]  Daily Treatment Note  NAME: Danette Enciso  : 1950  MRN: [id number]    Date of Service: 2022    Discharge Recommendations:  Patient would benefit from continued therapy after discharge   Patient Diagnosis(es): There were no encounter diagnoses. Assessment   Assessment: Pt continues to require minimal assistance with transfers, gait and all functional mobility. Pt was able to navigate up and down 5 steps with min A of 2. Pt states she has good support at home and is not going to rehab after discharge. AM-PAC score of 18/24 for current level of function. Activity Tolerance: Patient limited by fatigue;Patient limited by endurance   Plan    Plan  Plan: 2 times a day 7 days a week  Current Treatment Recommendations: Strengthening;ROM;Balance training;Functional mobility training;Transfer training; Endurance training;Gait training;Stair training;Home exercise program;Safety education & training;Patient/Caregiver education & training; Therapeutic activities     Restrictions  Restrictions/Precautions  Restrictions/Precautions: General Precautions,Fall Risk,Weight Bearing,Up as Tolerated,Surgical protocol  Lower Extremity Weight Bearing Restrictions  Left Lower Extremity Weight Bearing: Weight Bearing As Tolerated  Position Activity Restriction  Other position/activity restrictions: L UE IV, room air     Subjective    Subjective  Subjective: Pt agreeable to PT session (Nurse gives approval for PT session)  Orientation  Overall Orientation Status: Within Normal Limits  Cognition  Overall Cognitive Status: Exceptions (cognition appears WFLs, however pt lethargic and drowsy following surgery. Will continue to assess as appropriate)  Following Commands:  Follows multistep commands with repitition  Memory: Appears intact  Safety Judgement: Decreased awareness of need for safety;Decreased awareness of need for assistance  Problem Solving: Assistance required to generate solutions;Assistance required to implement solutions;Assistance required to correct errors made;Decreased awareness of errors  Insights: Decreased awareness of deficits  Initiation: Requires cues for some  Sequencing: Does not require cues   Objective   Bed Mobility Training  Bed Mobility Training: Yes  Overall Level of Assistance: Contact-guard assistance;Minimum assistance  Interventions: Safety awareness training;Verbal cues; Tactile cues  Rolling: Contact-guard assistance  Supine to Sit: Contact-guard assistance;Minimum assistance for LEs  Scooting: Contact-guard assistance;Minimum assistance  Balance  Sitting: Intact  Standing: With support  Transfer Training  Transfer Training: Yes  Overall Level of Assistance: Contact-guard assistance;Minimum assistance  Interventions: Safety awareness training; Tactile cues; Verbal cues  Sit to Stand: Minimum assistance  Stand to Sit: Contact-guard assistance; Minimal assistance  Bed to Chair: Minimum assistance  Gait Training  Gait Training: Yes  Right Side Weight Bearing: Full  Left Side Weight Bearing: As tolerated  Gait  Overall Level of Assistance: Minimum assistance  Interventions: Verbal cues; Safety awareness training;Demonstration  Base of Support: Narrowed  Speed/Karin: Slow  Gait Abnormalities: Step to gait; cues for safe pattern with rw; gait progress from min A to CGA once up and moving. Distance (ft): 10 Feet and 15 Feet  Assistive Device: Walker, rolling;Gait belt  Stair Training  Rail Use: Both  Stairs - Level of Assistance: Minimum assistance;Assist X2  Number of Stairs Trained: 5  Pt demonstrated good pattern on stairs, overall fair steadiness. Pt states she will have help from son and grandsons at home     Safety Devices  Type of Devices: All fall risk precautions in place;Call light within reach;Gait belt;Patient at risk for falls;Nurse notified; Heels elevated for pressure relief;Left in chair;Chair alarm in place   Goals  Short Term Goals  Time Frame for Short term goals: 6 visits:  Short term goal 1: Pt. To be indep. With bed mob. Using sheet as leg  for surgical LE as needed  Short term goal 2: Pt. To be indep. With transfers with safe hand placement 100% of time  Short term goal 3: Pt. To be indep with gait with RW, 100ft. Short term goal 4: Pt. To safely negotiate 2-3 stairs to prepare for entering home at discharge. Short term goal 5: Pt.  To be indep with TKA  HEP  Patient Goals   Patient goals : d/c home tomorrow    Education  Patient Education  Education Given To: Patient  Education Provided: Role of Therapy;Plan of Care;Home Exercise Program;Transfer Training  Education Outcome: Verbalized understanding;Demonstrated understanding    Therapy Time   Individual Concurrent Group Co-treatment   Time In 1237         Time Out 1300         Minutes [place], [place]

## 2022-06-29 ENCOUNTER — CARE COORDINATION (OUTPATIENT)
Dept: CASE MANAGEMENT | Age: 72
End: 2022-06-29

## 2022-06-29 NOTE — CARE COORDINATION
[facility] Transitions of Care Initial Call    2022      Patient Name: Karmen Scott         Patient : 1950     Discharge Date: 22    RARS: Readmission Risk Score: No data recorded  PCP: Juliene Olszewski, MD      Discharge Facility: [facility]    Call within 2 business days of discharge: Yes      Message left in compliance with HIPPA. Stated who I was, representing the [facility], Care Transitions Team.  Requested to place a call to their surgeon with any immediate needs/questions/concerns. CTN will continue to follow per Joint Bundle Protocol. No future appointments. Plan for follow-up call in 1-2 days based on severity of symptoms and risk factors.   Plan for next call: symptom management-assess  self management-assess  follow up appointment-assess  medication management-assess

## 2022-06-30 ENCOUNTER — CARE COORDINATION (OUTPATIENT)
Dept: CASE MANAGEMENT | Age: 72
End: 2022-06-30

## 2022-06-30 NOTE — CARE COORDINATION
[facility] Transitions of Care Subsequent Call    2022      Patient Name: Zaki Islas         Patient : 1950     Discharge Date: 22    RARS: Readmission Risk Score: No data recorded  PCP: Donato Mcdaniel MD      Second attempt to contact patient. Mail box is full. Care Transitions will continue to follow. No future appointments. Plan for follow-up call in 3-5 days based on severity of symptoms and risk factors.   Plan for next call: symptom management-assess  self management-assess  follow up appointment-assess  medication management-assess

## 2022-07-05 ENCOUNTER — CARE COORDINATION (OUTPATIENT)
Dept: CASE MANAGEMENT | Age: 72
End: 2022-07-05

## 2022-07-05 NOTE — CARE COORDINATION
[facility] Transitions of Care Subsequent Call    2022      Patient Name: Amor Paula         Patient : 1950     Discharge Date: 22    RARS: Readmission Risk Score: No data recorded  PCP: Jared Srivastava MD      Third attempt to contact patient. Will continue to follow. No future appointments. Attempt to contact in 2 weeks. based on severity of symptoms and risk factors.   Plan for next call: symptom management-assess  self management-assess

## 2022-07-19 ENCOUNTER — CARE COORDINATION (OUTPATIENT)
Dept: CASE MANAGEMENT | Age: 72
End: 2022-07-19

## 2022-07-19 NOTE — CARE COORDINATION
Care Transitions Outreach Attempt #4    Patient: Ariane Avelar Patient : 1950 MRN: <[id number]>    Last Discharge [facility]       Date Complaint Diagnosis Description Type Department Provider    22   Admission (Discharged) Rachid Brasher MD            [facility] Transitions of Care Subsequent Call     2022        Patient Name: Ariane Avelar         Patient : 1950           Discharge Date: 22    RARS: Readmission Risk Score: No data recorded  PCP: Jacinta Mock MD       Fourth attempt to contact patient. Left HIPAA compliant VM to Nora Fears requesting call back at her earliest convenience. Will continue to follow. No future appointments. Attempt to contact in 2 weeks. based on severity of symptoms and risk factors.     Plan for next call: symptom management-assess  self management-assess     Reggie Saab RN BSN   Care Transitions Nurse  [phone]

## 2022-08-02 ENCOUNTER — CARE COORDINATION (OUTPATIENT)
Dept: CASE MANAGEMENT | Age: 72
End: 2022-08-02

## 2022-08-02 NOTE — CARE COORDINATION
[facility] Transitions of Care Subsequent Call    2022      Patient Name: Donavan Magdaleno         Patient : 1950     Discharge Date: 22    RARS: Readmission Risk Score: No data recorded  PCP: Camilla Bassett MD      Attempted multiple times to contact patient. Unable to leave a message. No future appointments. Will follow remotely.       Plan for next call:  assess needs

## 2022-09-22 ENCOUNTER — CARE COORDINATION (OUTPATIENT)
Dept: CASE MANAGEMENT | Age: 72
End: 2022-09-22

## 2022-09-22 NOTE — CARE COORDINATION
[facility] Transitions of Care Subsequent Call    2022      Patient Name: Flores Dixon         Patient : 1950     Discharge Date: 22    RARS: Readmission Risk Score: No data recorded  PCP: Desmond Ortega MD        Challenges to be reviewed by the provider   Additional needs identified to be addressed with provider: No  none             Method of communication with provider : none    Care Transition Nurse (CTN) contacted the patient by telephone to perform post hospital discharge assessment. Verified name and  with patient as identifiers. Provided introduction to self, and explanation of the CTN role. Patient given an opportunity to ask questions and does not have any further questions or concerns at this time. Assessment:  States doing well. No questions, concerns. [facility] Episode Completed.

## (undated) DEVICE — 3M™ IOBAN™ 2 ANTIMICROBIAL INCISE DRAPE 6650EZ: Brand: IOBAN™ 2

## (undated) DEVICE — 4-PORT MANIFOLD: Brand: NEPTUNE 2

## (undated) DEVICE — BLADE ES L6IN ELASTOMERIC COAT EXT DURABLE BEND UPTO 90DEG

## (undated) DEVICE — GLOVE SURG SZ 65 CRM LTX FREE POLYISOPRENE POLYMER BEAD ANTI

## (undated) DEVICE — WEREWOLF FASTSEAL 6.0 HEMOSTASIS WAND: Brand: FASTSEAL 6.0 HEMOSTASIS WAND

## (undated) DEVICE — GLOVE SURG SZ 8 CRM LTX FREE POLYISOPRENE POLYMER BEAD ANTI

## (undated) DEVICE — SILVER-COATED ANTIMICROBIAL BARRIER DRESSING: Brand: ACTICOAT FLEX3 4" X 4"

## (undated) DEVICE — PICO 7 10CM X 30CM: Brand: PICO™ 7

## (undated) DEVICE — GLOVE SURG SZ 7 CRM LTX FREE POLYISOPRENE POLYMER BEAD ANTI

## (undated) DEVICE — BANDAGE COBAN 4 IN COMPR W4INXL5YD FOAM COHESIVE QUIK STK SELF ADH SFT

## (undated) DEVICE — INTENDED FOR TISSUE SEPARATION, AND OTHER PROCEDURES THAT REQUIRE A SHARP SURGICAL BLADE TO PUNCTURE OR CUT.: Brand: BARD-PARKER ® CARBON RIB-BACK BLADES

## (undated) DEVICE — CEMENT MIXING SYSTEM WITH FEMORAL BREAKWAY NOZZLE: Brand: REVOLUTION

## (undated) DEVICE — DRAPE,REIN 53X77,STERILE: Brand: MEDLINE

## (undated) DEVICE — TOWEL,OR,DSP,ST,BLUE,DLX,XR,4/PK,20PK/CS: Brand: MEDLINE

## (undated) DEVICE — ZIMMER® STERILE DISPOSABLE TOURNIQUET CUFF WITH PLC, DUAL PORT, SINGLE BLADDER, 42 IN. (107 CM)

## (undated) DEVICE — PREP-RESISTANT MARKER W/ RULER: Brand: MEDLINE INDUSTRIES, INC.

## (undated) DEVICE — BLADE SAGITAL 18X90X1.27MM

## (undated) DEVICE — BLANKET WRM W29.9XL79.1IN UP BODY FORC AIR MISTRAL-AIR

## (undated) DEVICE — TUBING, SUCTION, 1/4" X 12', STRAIGHT: Brand: MEDLINE

## (undated) DEVICE — SUTURE STRATAFIX SYMMETRIC PDS + SZ 1 L18IN ABSRB VLT L48MM SXPP1A400

## (undated) DEVICE — Device

## (undated) DEVICE — GLOVE SURG SZ 8 L12IN FNGR THK79MIL GRN LTX FREE

## (undated) DEVICE — BANDAGE,GAUZE,BULKEE II,4.5"X4.1YD,STRL: Brand: MEDLINE

## (undated) DEVICE — DECANTER FLD 9IN ST BG FOR ASEP TRNSF OF FLD

## (undated) DEVICE — NEEDLE SPNL 18GA L3.5IN W/ QNCKE SHARPER BVL DURA CLICK

## (undated) DEVICE — GLOVE SURG SZ 65 L12IN THK91MIL BRN LTX FREE

## (undated) DEVICE — GLOVE SURG SZ 85 CRM LTX FREE POLYISOPRENE POLYMER BEAD ANTI

## (undated) DEVICE — SYRINGE 20ML LL S/C 50

## (undated) DEVICE — 2108 SERIES SAGITTAL BLADE, NO OFFSET  (18.6 X 1.24 X 105MM)

## (undated) DEVICE — SUTURE STRATAFIX SPRL MCRYL + 3 0 SGL ARMED PS 1 24 IN LEN SXMP1B103

## (undated) DEVICE — DRAPE XR CASS L UNIV FIT ADH CLSR

## (undated) DEVICE — PIN DRL THRD HDLSS SIG

## (undated) DEVICE — GARMENT,MEDLINE,DVT,INT,CALF,MED, GEN2: Brand: MEDLINE

## (undated) DEVICE — 450 ML BOTTLE OF 0.05% CHLORHEXIDINE GLUCONATE IN 99.95% STERILE WATER FOR IRRIGATION, USP AND APPLICATOR.: Brand: IRRISEPT ANTIMICROBIAL WOUND LAVAGE

## (undated) DEVICE — ZIPPERED TOGA, X-LARGE: Brand: FLYTE

## (undated) DEVICE — GUIDEPIN ORTH THRD HI PERF HD SIG

## (undated) DEVICE — SOLUTION IV 250ML 0.9% SOD CHL PH 5 INJ USP VIAFLX PLAS

## (undated) DEVICE — SOLUTION IV 1000ML 0.9% SOD CHL PH 5 INJ USP VIAFLX PLAS

## (undated) DEVICE — CONTAINER,SPECIMEN,O.R.STRL,4.5OZ: Brand: MEDLINE

## (undated) DEVICE — YANKAUER,FLEXIBLE HANDLE,REGLR CAPACITY: Brand: MEDLINE INDUSTRIES, INC.

## (undated) DEVICE — SUTURE VCRL SZ 2-0 L36IN ABSRB UD L36MM CT-1 1/2 CIR J945H

## (undated) DEVICE — NEPTUNE E-SEP 165MM SUCTION SLEEVE: Brand: NEPTUNE E-SEP

## (undated) DEVICE — GLOVE SURG SZ 75 CRM LTX FREE POLYISOPRENE POLYMER BEAD ANTI

## (undated) DEVICE — BLADE SAW W12.5XL70MM THK0.8MM CUT THK1.12MM S STL RECIP

## (undated) DEVICE — GOWN,SIRUS,NON REINFRCD,LARGE,SET IN SL: Brand: MEDLINE

## (undated) DEVICE — CHLORAPREP 26ML ORANGE